# Patient Record
Sex: FEMALE | Race: WHITE | NOT HISPANIC OR LATINO | Employment: FULL TIME | URBAN - METROPOLITAN AREA
[De-identification: names, ages, dates, MRNs, and addresses within clinical notes are randomized per-mention and may not be internally consistent; named-entity substitution may affect disease eponyms.]

---

## 2018-03-07 ENCOUNTER — HOSPITAL ENCOUNTER (OUTPATIENT)
Dept: LAB | Facility: MEDICAL CENTER | Age: 38
End: 2018-03-07
Attending: NURSE PRACTITIONER
Payer: COMMERCIAL

## 2018-03-07 LAB
25(OH)D3 SERPL-MCNC: 4 NG/ML (ref 30–100)
ALBUMIN SERPL BCP-MCNC: 4.2 G/DL (ref 3.2–4.9)
ALBUMIN/GLOB SERPL: 1.7 G/DL
ALP SERPL-CCNC: 46 U/L (ref 30–99)
ALT SERPL-CCNC: 16 U/L (ref 2–50)
ANION GAP SERPL CALC-SCNC: 9 MMOL/L (ref 0–11.9)
AST SERPL-CCNC: 25 U/L (ref 12–45)
BASOPHILS # BLD AUTO: 1.4 % (ref 0–1.8)
BASOPHILS # BLD: 0.08 K/UL (ref 0–0.12)
BILIRUB SERPL-MCNC: 0.7 MG/DL (ref 0.1–1.5)
BUN SERPL-MCNC: 5 MG/DL (ref 8–22)
CALCIUM SERPL-MCNC: 8.9 MG/DL (ref 8.5–10.5)
CHLORIDE SERPL-SCNC: 102 MMOL/L (ref 96–112)
CHOLEST SERPL-MCNC: 143 MG/DL (ref 100–199)
CO2 SERPL-SCNC: 27 MMOL/L (ref 20–33)
CREAT SERPL-MCNC: 0.68 MG/DL (ref 0.5–1.4)
EOSINOPHIL # BLD AUTO: 0.35 K/UL (ref 0–0.51)
EOSINOPHIL NFR BLD: 6 % (ref 0–6.9)
ERYTHROCYTE [DISTWIDTH] IN BLOOD BY AUTOMATED COUNT: 46.5 FL (ref 35.9–50)
GLOBULIN SER CALC-MCNC: 2.5 G/DL (ref 1.9–3.5)
GLUCOSE SERPL-MCNC: 71 MG/DL (ref 65–99)
HCT VFR BLD AUTO: 42.3 % (ref 37–47)
HDLC SERPL-MCNC: 84 MG/DL
HGB BLD-MCNC: 15 G/DL (ref 12–16)
IMM GRANULOCYTES # BLD AUTO: 0.02 K/UL (ref 0–0.11)
IMM GRANULOCYTES NFR BLD AUTO: 0.3 % (ref 0–0.9)
LDLC SERPL CALC-MCNC: 19 MG/DL
LYMPHOCYTES # BLD AUTO: 1.67 K/UL (ref 1–4.8)
LYMPHOCYTES NFR BLD: 28.8 % (ref 22–41)
MCH RBC QN AUTO: 36.6 PG (ref 27–33)
MCHC RBC AUTO-ENTMCNC: 35.5 G/DL (ref 33.6–35)
MCV RBC AUTO: 103.2 FL (ref 81.4–97.8)
MONOCYTES # BLD AUTO: 0.57 K/UL (ref 0–0.85)
MONOCYTES NFR BLD AUTO: 9.8 % (ref 0–13.4)
NEUTROPHILS # BLD AUTO: 3.11 K/UL (ref 2–7.15)
NEUTROPHILS NFR BLD: 53.7 % (ref 44–72)
NRBC # BLD AUTO: 0 K/UL
NRBC BLD-RTO: 0 /100 WBC
PLATELET # BLD AUTO: 241 K/UL (ref 164–446)
PMV BLD AUTO: 9.4 FL (ref 9–12.9)
POTASSIUM SERPL-SCNC: 3.6 MMOL/L (ref 3.6–5.5)
PROT SERPL-MCNC: 6.7 G/DL (ref 6–8.2)
RBC # BLD AUTO: 4.1 M/UL (ref 4.2–5.4)
SODIUM SERPL-SCNC: 138 MMOL/L (ref 135–145)
T4 FREE SERPL-MCNC: 0.84 NG/DL (ref 0.53–1.43)
TRIGL SERPL-MCNC: 202 MG/DL (ref 0–149)
TSH SERPL DL<=0.005 MIU/L-ACNC: 1.27 UIU/ML (ref 0.38–5.33)
WBC # BLD AUTO: 5.8 K/UL (ref 4.8–10.8)

## 2018-03-07 PROCEDURE — 85025 COMPLETE CBC W/AUTO DIFF WBC: CPT

## 2018-03-07 PROCEDURE — 84443 ASSAY THYROID STIM HORMONE: CPT

## 2018-03-07 PROCEDURE — 80053 COMPREHEN METABOLIC PANEL: CPT

## 2018-03-07 PROCEDURE — 80061 LIPID PANEL: CPT

## 2018-03-07 PROCEDURE — 36415 COLL VENOUS BLD VENIPUNCTURE: CPT

## 2018-03-07 PROCEDURE — 82306 VITAMIN D 25 HYDROXY: CPT

## 2018-03-07 PROCEDURE — 84439 ASSAY OF FREE THYROXINE: CPT

## 2018-03-19 ENCOUNTER — HOSPITAL ENCOUNTER (OUTPATIENT)
Dept: LAB | Facility: MEDICAL CENTER | Age: 38
End: 2018-03-19
Attending: NURSE PRACTITIONER
Payer: COMMERCIAL

## 2018-03-19 LAB
B-HCG SERPL-ACNC: <0.6 MIU/ML (ref 0–5)
ESTRADIOL SERPL-MCNC: 171 PG/ML
FSH SERPL-ACNC: 3.5 MIU/ML
LH SERPL-ACNC: 11 IU/L
PROLACTIN SERPL-MCNC: 15.93 NG/ML (ref 2.8–26)

## 2018-03-19 PROCEDURE — 82670 ASSAY OF TOTAL ESTRADIOL: CPT

## 2018-03-19 PROCEDURE — 84702 CHORIONIC GONADOTROPIN TEST: CPT

## 2018-03-19 PROCEDURE — 84146 ASSAY OF PROLACTIN: CPT

## 2018-03-19 PROCEDURE — 36415 COLL VENOUS BLD VENIPUNCTURE: CPT

## 2018-03-19 PROCEDURE — 83001 ASSAY OF GONADOTROPIN (FSH): CPT

## 2018-03-19 PROCEDURE — 83002 ASSAY OF GONADOTROPIN (LH): CPT

## 2018-04-22 ENCOUNTER — OFFICE VISIT (OUTPATIENT)
Dept: URGENT CARE | Facility: CLINIC | Age: 38
End: 2018-04-22
Payer: COMMERCIAL

## 2018-04-22 VITALS
OXYGEN SATURATION: 97 % | BODY MASS INDEX: 23.93 KG/M2 | TEMPERATURE: 98.3 F | WEIGHT: 126.76 LBS | HEIGHT: 61 IN | SYSTOLIC BLOOD PRESSURE: 140 MMHG | DIASTOLIC BLOOD PRESSURE: 104 MMHG | HEART RATE: 97 BPM | RESPIRATION RATE: 20 BRPM

## 2018-04-22 DIAGNOSIS — K29.60 NSAID INDUCED GASTRITIS: ICD-10-CM

## 2018-04-22 DIAGNOSIS — T39.395A NSAID INDUCED GASTRITIS: ICD-10-CM

## 2018-04-22 DIAGNOSIS — R10.9 ABDOMINAL CRAMPS: ICD-10-CM

## 2018-04-22 DIAGNOSIS — K08.89 ODONTALGIA: ICD-10-CM

## 2018-04-22 PROCEDURE — 99202 OFFICE O/P NEW SF 15 MIN: CPT | Performed by: PHYSICIAN ASSISTANT

## 2018-04-22 RX ORDER — CLINDAMYCIN HYDROCHLORIDE 300 MG/1
300 CAPSULE ORAL 2 TIMES DAILY
Qty: 14 CAP | Refills: 0 | Status: SHIPPED | OUTPATIENT
Start: 2018-04-22 | End: 2018-04-29

## 2018-04-22 ASSESSMENT — ENCOUNTER SYMPTOMS
ROS GI COMMENTS: 1
DIARRHEA: 0
FEVER: 0
CHANGE IN BOWEL HABIT: 0
ABDOMINAL PAIN: 1
BLOOD IN STOOL: 0
SORE THROAT: 0
NAUSEA: 0
CONSTITUTIONAL NEGATIVE: 1
VOMITING: 0

## 2018-04-22 NOTE — PROGRESS NOTES
"Subjective:      Kenisha Domingo is a 37 y.o. female who presents with GI Problem (cramping,sweat,clami)            GI Problem   This is a new (cramps, sweats w/o fever; started after wisdom tooth extract.; has been taking motrin 5d on empty stomach) problem. The current episode started yesterday. The problem occurs constantly. The problem has been unchanged. Associated symptoms include abdominal pain. Pertinent negatives include no change in bowel habit, fever, nausea, sore throat or vomiting. Nothing aggravates the symptoms. She has tried nothing for the symptoms. The treatment provided no relief.       Review of Systems   Constitutional: Negative.  Negative for fever.   HENT: Negative.  Negative for sore throat.    Gastrointestinal: Positive for abdominal pain. Negative for blood in stool, change in bowel habit, diarrhea, nausea and vomiting.        1   Genitourinary: Negative.    Skin: Negative.           Objective:     /104   Pulse 97   Temp 36.8 °C (98.3 °F)   Resp 20   Ht 1.549 m (5' 1\")   Wt 57.5 kg (126 lb 12.2 oz)   SpO2 97%   BMI 23.95 kg/m²      Physical Exam   Constitutional: She is oriented to person, place, and time. She appears well-developed and well-nourished. No distress.   HENT:   Head: Normocephalic and atraumatic.   Abdominal: Soft. Bowel sounds are normal. She exhibits no distension. There is no tenderness.   Neurological: She is alert and oriented to person, place, and time.   Skin: Skin is warm and dry.   Psychiatric: She has a normal mood and affect. Her behavior is normal.   Nursing note and vitals reviewed.    Active Ambulatory Problems     Diagnosis Date Noted   • No Active Ambulatory Problems     Resolved Ambulatory Problems     Diagnosis Date Noted   • No Resolved Ambulatory Problems     No Additional Past Medical History     No current outpatient prescriptions on file prior to visit.     No current facility-administered medications on file prior to visit.  "     Social History     Social History   • Marital status:      Spouse name: N/A   • Number of children: N/A   • Years of education: N/A     Occupational History   • Not on file.     Social History Main Topics   • Smoking status: Current Every Day Smoker   • Smokeless tobacco: Never Used   • Alcohol use Not on file   • Drug use: Unknown   • Sexual activity: Not on file     Other Topics Concern   • Not on file     Social History Narrative   • No narrative on file     History reviewed. No pertinent family history.  Patient has no known allergies.              Assessment/Plan:     ·  GI cramps, sweats [s/p wisdom tooth surg.]      · ?infection; rx meds; trial lomotil prn

## 2018-04-25 ENCOUNTER — HOSPITAL ENCOUNTER (EMERGENCY)
Facility: MEDICAL CENTER | Age: 38
End: 2018-04-26
Attending: EMERGENCY MEDICINE
Payer: COMMERCIAL
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
## 2018-04-25 DIAGNOSIS — F10.929 ALCOHOLIC INTOXICATION WITH COMPLICATION (HCC): ICD-10-CM

## 2018-04-25 PROCEDURE — 99284 EMERGENCY DEPT VISIT MOD MDM: CPT

## 2018-04-25 ASSESSMENT — PAIN SCALES - GENERAL: PAINLEVEL_OUTOF10: 3

## 2018-04-26 VITALS
DIASTOLIC BLOOD PRESSURE: 67 MMHG | SYSTOLIC BLOOD PRESSURE: 119 MMHG | BODY MASS INDEX: 26.07 KG/M2 | RESPIRATION RATE: 18 BRPM | WEIGHT: 138 LBS | TEMPERATURE: 98.1 F | OXYGEN SATURATION: 99 % | HEART RATE: 86 BPM

## 2018-04-26 ASSESSMENT — ENCOUNTER SYMPTOMS
BACK PAIN: 0
DIZZINESS: 0
SHORTNESS OF BREATH: 0
FEVER: 0
ABDOMINAL PAIN: 0

## 2018-04-26 ASSESSMENT — PAIN SCALES - GENERAL: PAINLEVEL_OUTOF10: 0

## 2018-04-26 NOTE — ED PROVIDER NOTES
ED Provider Note    ED Provider Note          CHIEF COMPLAINT  Chief Complaint   Patient presents with   • Fall   • Alcohol Intoxication       HPI  Kenisha Domingo is a 37 y.o. female who presents to the Emergency Department for concern of ines [place] home. She had a couple [place] shots. She fell in the shower. There has been & were having trouble getting out of the shower because she is not intoxicated therefore she was brought in by EMS. She currently now is more sober. She is denying any pain to me. She is asking to go home     REVIEW OF SYSTEMS  Review of Systems   Constitutional: Negative for fever.   Respiratory: Negative for shortness of breath.    Gastrointestinal: Negative for abdominal pain.   Genitourinary: Negative for difficulty urinating.   Musculoskeletal: Negative for back pain.   Neurological: Negative for dizziness.       PAST MEDICAL HISTORY       SURGICAL HISTORY  patient denies any surgical history    SOCIAL HISTORY  Social History   Substance Use Topics   • Smoking status: Current Every Day Smoker   • Smokeless tobacco: Never Used   • Alcohol use Not on file      History   Drug use: Unknown       FAMILY HISTORY  No family history on file.    CURRENT MEDICATIONS  Reviewed.  See Encounter Summary.     ALLERGIES  No Known Allergies    PHYSICAL EXAM  VITAL SIGNS: /67   Pulse 86   Temp 36.7 °C (98.1 °F)   Resp 18   Wt 62.6 kg (138 lb)   SpO2 99%   BMI 26.07 kg/m²   Physical Exam   Constitutional: She is oriented to person, place, and time.   HENT:   Head: Normocephalic and atraumatic.   Eyes: Pupils are equal, round, and reactive to light.   Neck: Normal range of motion.   Cardiovascular: Normal rate.    Pulmonary/Chest: Effort normal.   Abdominal: Soft.   Musculoskeletal: She exhibits no deformity.   Neurological: She is alert and oriented to person, place, and time.   Skin: Skin is warm. She is not diaphoretic.   Psychiatric: She has a normal mood and affect.  "            COURSE & MEDICAL DECISION MAKING  Pertinent Labs & Imaging studies reviewed. (See chart for details)    1:15 AM - Patient seen and examined at bedside.         Decision Making:  This is a 37 y.o. year old female who presents with concern about intoxication. When I thought her she is alert and oriented ×4. She had no slurred speech. She has steady gait. She did admit to drinking earlier. She had no signs of any trauma injuries. The fall was very minor. She said that they just called the EMS because \"they are man\" and they did not know what to do with her.  She is observed here in the emergency department and continued to metabolize her alcohol. She had no further complaints. She is discharged home in stable condition.    DISPOSITION:  Patient will be discharged home in stable condition.    FOLLOW UP:  [facility], Emergency Dept  [address] [facility] [address]  [phone]    If symptoms worsen      OUTPATIENT MEDICATIONS:  Discharge Medication List as of 4/26/2018  1:20 AM            FINAL IMPRESSION  1. Alcoholic intoxication with complication (CMS-HCC)    2. Fall in home, initial encounter                   "

## 2018-04-26 NOTE — ED NOTES
Patient discharged home, ambulates independently, a&Ox4. Verbalizes understanding of discharge instructions with opportunity for questions.

## 2018-04-26 NOTE — ED NOTES
Patient and  arguing with each other. Patient removed c-collar, cardiac leads, BP, and pulse ox. Patient ambulating around the room.  asked to leave as his presence is only agitating her. Pt refusing to keep c-collar on, but agrees to get back in bed for MD tariq.

## 2018-04-26 NOTE — ED NOTES
Enters by EMS c/o unwitnessed GLF in shower PTA. Unknown LOC. Reports slipped and fell. EMS reports patient could not get up d/t ETOH so  called for assistance. Pt reports drinking 6 anam shots and 3 beers. No obvious head trauma. Patient reports 3/10 pain when palpating T5-T7. C-collar in place. Denies blood thinners. Patient anxious and tearful at triage, asking for her .  en route via taxi. Blood glucose en route 140.

## 2018-04-26 NOTE — DISCHARGE INSTRUCTIONS
Alcohol, Frequently Asked Questions  WHAT IS ALCOHOL?  Ethyl alcohol, or ethanol, affects mood or behavior (psychoactive). It is a drug found in beer, wine, and hard liquor. Hard liquor is whiskey, vodka, gin, etc. Alcohol is produced by the fermentation of yeast, sugars, and starches.   WHAT DOES ALCOHOL DO TO THE BODY?  · Alcohol is a central nervous system depressant.   · It is rapidly absorbed from the stomach and small intestine. It passes into the bloodstream. It is then widely distributed throughout the body.   · Harmful effects of alcohol, can include:   · Impaired judgment.   · Reduced reaction time.   · Slurred speech.   · Difficulty walking.   · The effects of alcohol on the body are directly related to the amount consumed.   · In small amounts, alcohol can have a relaxing effect.   · When consumed rapidly and in large amounts, alcohol can also result in coma and death.   · Alcohol can interact with a number of prescription and non-prescription medications in ways that can intensify the effect of alcohol, of the medications themselves, or both.   · Alcohol use by pregnant women can cause serious damage to the developing fetus.   WHAT IS A STANDARD DRINK?  A standard drink is one 12 ounce beer, one 5 ounce glass of wine, or one 1.5 ounce shot of distilled spirits. Each of these drinks contains about half an ounce of alcohol.   IS BEER OR WINE SAFER TO DRINK THAN HARD LIQUOR?   No. One 12 ounce beer has about the same amount of alcohol as one 5 ounce glass of wine, or one 1.5 ounce shot of liquor.   WHAT IS MODERATE DRINKING?  Based on current [place]. Government dietary guidelines, moderate drinking for women is defined as an average of 1 drink or less per day. Moderate drinking for men is defined as an average of 2 drinks or less per day.   WHAT IS HEAVY DRINKING?  Heavy drinking is consuming alcohol in excess of 1 drink per day on average for women and greater than 2 drinks per day on average for men.   WHAT  "IS BINGE DRINKING?  Binge drinking is generally defined as having 5 or more drinks on one occasion. One occasion means in a row or within a short period of time. However, among women, binge drinking is often defined as having 4 or more drinks on one occasion. This lower cut-point is used for women because women are generally of smaller stature than men.   WHAT IS ALCOHOLISM?  Alcoholism is a primary, long-lasting (chronic) disease with inherited (genetic) tendencies. Alcoholism has psychological and social (psychosocial), and environmental factors influencing its development and signs and symptoms. The disease often becomes more severe (progressive) and eventually fatal. It is characterized by continuous or periodic:  · Lack of control over drinking.   · Fixation with the drug alcohol.   · Use of alcohol despite harmful consequences.   · Distortions in thinking, including denial.   WHAT IS ALCOHOL ABUSE?  Alcohol abuse is characterized by recurrent alcohol-related problems, including problems with relationships, job performance, or both; the use of alcohol in hazardous situations (e.g., while driving a car); or some combination of these.  WHY ARE SOME PEOPLE MORE SENSITIVE TO ALCOHOL THAN OTHERS?  Individual reactions to alcohol can vary greatly, and may be influenced by many factors, including:  · Age.   · Gender.   · Race.   · A specific origin or culture (ethnicity).   · Physical condition.   · The amount of food eaten before drinking.   · The use of drugs or medicines.   · Family history of alcohol problems.   · Many other factors as well.   Therefore, while drinking guidelines and definitions of drinking patterns can be very helpful in identifying risky patterns of alcohol use, personal decisions about whether to drink, and if so, when and how much, should take into account these individual factors as well.   WHAT DOES IT MEAN TO DRINK TOO MUCH?  · A person may be said to be \"drinking too much\" or engaging in " "\"excessive drinking\" if they exceed the guidelines for average or daily alcohol consumption.   · Among men, excessive drinking may be defined as more than 4 drinks per day or an average of more than 2 drinks per day over a 7 or 30 day period.   · Among women, excessive drinking may be defined as more than 3 drinks per day or an average of more than 1 drink per day over a 7 or 30 day period.   · Current guidelines state that some individuals (youth under age 21 years, pregnant women, and persons recovering from alcoholism) should not drink at all. Among these people, any alcohol consumption may be too much.   · Anyone who chooses to drink should be aware that individual reactions to alcohol can vary greatly. When unsure about drinking, it is best to consult one's own personal caregiver.   WHAT DOES IT MEAN TO GET DRUNK?  Drunkenness is the state of being intoxicated by consumption of alcoholic beverages to a degree that mental and physical faculties are noticeably impaired. However, the number of drinks that an individual needs to consume to get drunk varies based on a number of factors. These include: age, gender, physical condition, the amount of food eaten before drinking, and the use of drugs or medicines. Binge drinking typically results in intoxication.   WHAT DOES IT MEAN TO BE ABOVE THE LEGAL LIMIT FOR DRINKING?  Legal limits for operating a vehicle are defined by a government agency. Blood or breath tests are used to find out how much alcohol is in your system. If you are found to have more alcohol in your system than is allowed in your region, you will be punished by law. This does not mean it is safe to drive under the legal limit.  WHAT ARE COMMON HEALTH EFFECTS OF DRINKING TOO MUCH?  Excessive drinking, including binge and heavy drinking, has numerous chronic (long-term) and acute (short-term) health effects.  Chronic health consequences of excessive drinking can include:  · Damage to liver cells (liver " cirrhosis).   · Inflammation of the pancreas (pancreatitis).   · Various cancers, including:   · Liver cancer.   · Mouth cancer.   · Throat cancer.   · Voice box (larynx) cancer.   · Esophageal cancer.   · High blood pressure.   · Psychological disorders.   · Sudden (acute) health consequences of excessive drinking can include:   · Alcohol poisoning and death.   · Motor vehicle injuries.   · Falls.   · Domestic violence.   · Rape.   · Child abuse.   HOW DO I KNOW IF IT IS OKAY TO DRINK?  If you choose to drink alcohol, do so in moderation. Remember that there are some people who should not drink alcohol at all. These people include:  · Children and adolescents.   · People who cannot restrict their drinking to moderate levels.   · Women who may become pregnant or who are pregnant.   · People who plan to drive or operate machinery.   · People taking prescription or over-the-counter medications that can interact with alcohol.   When in doubt, it is always best to consult one's own caregiver.  HOW DO I KNOW IF I HAVE A DRINKING PROBLEM?  Drinking is a problem if it causes trouble in:  · Your relationships.   · School.   · Social activities.   · How you think and feel.   If you are concerned that either you or someone in your family might have a drinking problem, consult your caregivers. There are many resources available to assist people with drinking problems.  Document Released: 12/20/2004 Document Revised: 06/18/2013 Document Reviewed: 12/11/2009  ExitCare® Patient Information ©2013 [employer].    How Much is Too Much Alcohol?  Drinking too much alcohol can cause injury, accidents, and health problems. These types of problems can include:   · Car crashes.  · Falls.  · Family fighting (domestic violence).  · Drowning.  · Fights.  · Injuries.  · Burns.  · Damage to certain organs.  · Having a baby with birth defects.  ONE DRINK CAN BE TOO MUCH WHEN YOU ARE:  · Working.  · Pregnant or breastfeeding.  · Taking  "medicines. Ask your doctor.  · Driving or planning to drive.  WHAT IS A STANDARD DRINK?   · 1 regular beer (12 ounces or 360 milliliters).  · 1 glass of wine (5 ounces or 150 milliliters).  · 1 shot of liquor (1.5 ounces or 45 milliliters).  BLOOD ALCOHOL LEVELS   · .00 A person is sober.  · .03 A person has no trouble keeping balance, talking, or seeing right, but a \"buzz\" may be felt.  · .05 A person feels \"buzzed\" and relaxed.  · .08 or .10  A person is drunk. He or she has trouble talking, seeing right, and keeping his or her balance.  · .15 A person loses body control and may pass out (blackout).  · .20 A person has trouble walking (staggering) and throws up (vomits).  · .30 A person will pass out (unconscious).  · .40+ A person will be in a coma. Death is possible.  If you or someone you know has a drinking problem, get help from a doctor.   Document Released: 10/14/2010 Document Revised: 03/11/2013 Document Reviewed: 10/14/2010  [employer]® Patient Information ©2014 [employer].    "

## 2018-09-12 ENCOUNTER — HOSPITAL ENCOUNTER (OUTPATIENT)
Dept: LAB | Facility: MEDICAL CENTER | Age: 38
End: 2018-09-12
Attending: NURSE PRACTITIONER
Payer: COMMERCIAL

## 2018-09-12 LAB
25(OH)D3 SERPL-MCNC: 34 NG/ML (ref 30–100)
ALBUMIN SERPL BCP-MCNC: 4.1 G/DL (ref 3.2–4.9)
ALBUMIN/GLOB SERPL: 1.5 G/DL
ALP SERPL-CCNC: 47 U/L (ref 30–99)
ALT SERPL-CCNC: 14 U/L (ref 2–50)
ANION GAP SERPL CALC-SCNC: 7 MMOL/L (ref 0–11.9)
AST SERPL-CCNC: 23 U/L (ref 12–45)
BASOPHILS # BLD AUTO: 0.9 % (ref 0–1.8)
BASOPHILS # BLD: 0.06 K/UL (ref 0–0.12)
BILIRUB SERPL-MCNC: 0.6 MG/DL (ref 0.1–1.5)
BUN SERPL-MCNC: 7 MG/DL (ref 8–22)
CALCIUM SERPL-MCNC: 9 MG/DL (ref 8.5–10.5)
CHLORIDE SERPL-SCNC: 102 MMOL/L (ref 96–112)
CHOLEST SERPL-MCNC: 177 MG/DL (ref 100–199)
CO2 SERPL-SCNC: 26 MMOL/L (ref 20–33)
CREAT SERPL-MCNC: 0.58 MG/DL (ref 0.5–1.4)
EOSINOPHIL # BLD AUTO: 0.31 K/UL (ref 0–0.51)
EOSINOPHIL NFR BLD: 4.8 % (ref 0–6.9)
ERYTHROCYTE [DISTWIDTH] IN BLOOD BY AUTOMATED COUNT: 47 FL (ref 35.9–50)
GLOBULIN SER CALC-MCNC: 2.7 G/DL (ref 1.9–3.5)
GLUCOSE SERPL-MCNC: 94 MG/DL (ref 65–99)
HCT VFR BLD AUTO: 43.3 % (ref 37–47)
HDLC SERPL-MCNC: 77 MG/DL
HGB BLD-MCNC: 14.9 G/DL (ref 12–16)
IMM GRANULOCYTES # BLD AUTO: 0.02 K/UL (ref 0–0.11)
IMM GRANULOCYTES NFR BLD AUTO: 0.3 % (ref 0–0.9)
LDLC SERPL CALC-MCNC: 83 MG/DL
LYMPHOCYTES # BLD AUTO: 1.59 K/UL (ref 1–4.8)
LYMPHOCYTES NFR BLD: 24.7 % (ref 22–41)
MCH RBC QN AUTO: 35 PG (ref 27–33)
MCHC RBC AUTO-ENTMCNC: 34.4 G/DL (ref 33.6–35)
MCV RBC AUTO: 101.6 FL (ref 81.4–97.8)
MONOCYTES # BLD AUTO: 0.64 K/UL (ref 0–0.85)
MONOCYTES NFR BLD AUTO: 9.9 % (ref 0–13.4)
NEUTROPHILS # BLD AUTO: 3.83 K/UL (ref 2–7.15)
NEUTROPHILS NFR BLD: 59.4 % (ref 44–72)
NRBC # BLD AUTO: 0 K/UL
NRBC BLD-RTO: 0 /100 WBC
PLATELET # BLD AUTO: 262 K/UL (ref 164–446)
PMV BLD AUTO: 9.9 FL (ref 9–12.9)
POTASSIUM SERPL-SCNC: 3.6 MMOL/L (ref 3.6–5.5)
PROT SERPL-MCNC: 6.8 G/DL (ref 6–8.2)
RBC # BLD AUTO: 4.26 M/UL (ref 4.2–5.4)
SODIUM SERPL-SCNC: 135 MMOL/L (ref 135–145)
T4 FREE SERPL-MCNC: 0.94 NG/DL (ref 0.53–1.43)
TRIGL SERPL-MCNC: 87 MG/DL (ref 0–149)
TSH SERPL DL<=0.005 MIU/L-ACNC: 1.15 UIU/ML (ref 0.38–5.33)
WBC # BLD AUTO: 6.5 K/UL (ref 4.8–10.8)

## 2018-09-12 PROCEDURE — 84443 ASSAY THYROID STIM HORMONE: CPT

## 2018-09-12 PROCEDURE — 80061 LIPID PANEL: CPT

## 2018-09-12 PROCEDURE — 36415 COLL VENOUS BLD VENIPUNCTURE: CPT

## 2018-09-12 PROCEDURE — 85025 COMPLETE CBC W/AUTO DIFF WBC: CPT

## 2018-09-12 PROCEDURE — 84439 ASSAY OF FREE THYROXINE: CPT

## 2018-09-12 PROCEDURE — 80053 COMPREHEN METABOLIC PANEL: CPT

## 2018-09-12 PROCEDURE — 82306 VITAMIN D 25 HYDROXY: CPT

## 2018-09-19 ENCOUNTER — HOSPITAL ENCOUNTER (OUTPATIENT)
Dept: BEHAVIORAL HEALTH | Facility: MEDICAL CENTER | Age: 38
End: 2018-09-19
Attending: PSYCHIATRY & NEUROLOGY
Payer: COMMERCIAL

## 2018-09-19 ENCOUNTER — OFFICE VISIT (OUTPATIENT)
Dept: BEHAVIORAL HEALTH | Facility: PHYSICIAN GROUP | Age: 38
End: 2018-09-19
Payer: COMMERCIAL

## 2018-09-19 VITALS
DIASTOLIC BLOOD PRESSURE: 91 MMHG | HEART RATE: 76 BPM | WEIGHT: 130 LBS | HEIGHT: 61 IN | SYSTOLIC BLOOD PRESSURE: 130 MMHG | BODY MASS INDEX: 24.55 KG/M2

## 2018-09-19 DIAGNOSIS — F32.A DEPRESSIVE DISORDER: ICD-10-CM

## 2018-09-19 DIAGNOSIS — F10.90 ALCOHOL USE DISORDER: ICD-10-CM

## 2018-09-19 DIAGNOSIS — F41.1 GENERALIZED ANXIETY DISORDER: ICD-10-CM

## 2018-09-19 DIAGNOSIS — F33.1 MAJOR DEPRESSIVE DISORDER, RECURRENT EPISODE, MODERATE (HCC): ICD-10-CM

## 2018-09-19 DIAGNOSIS — F10.20 ALCOHOL USE DISORDER, SEVERE, DEPENDENCE (HCC): ICD-10-CM

## 2018-09-19 DIAGNOSIS — F41.9 ANXIETY DISORDER, UNSPECIFIED TYPE: ICD-10-CM

## 2018-09-19 PROCEDURE — 99204 OFFICE O/P NEW MOD 45 MIN: CPT | Performed by: PSYCHIATRY & NEUROLOGY

## 2018-09-19 PROCEDURE — 90791 PSYCH DIAGNOSTIC EVALUATION: CPT | Performed by: MARRIAGE & FAMILY THERAPIST

## 2018-09-19 RX ORDER — FLUOXETINE HYDROCHLORIDE 20 MG/1
20 CAPSULE ORAL DAILY
COMMUNITY
End: 2018-09-26
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
## 2018-09-20 ENCOUNTER — HOSPITAL ENCOUNTER (OUTPATIENT)
Dept: BEHAVIORAL HEALTH | Facility: MEDICAL CENTER | Age: 38
End: 2018-09-20
Attending: PSYCHIATRY & NEUROLOGY
Payer: COMMERCIAL

## 2018-09-20 DIAGNOSIS — F32.A DEPRESSIVE DISORDER: ICD-10-CM

## 2018-09-20 DIAGNOSIS — F10.20 ALCOHOL USE DISORDER, SEVERE, DEPENDENCE (HCC): ICD-10-CM

## 2018-09-20 PROCEDURE — G0177 OPPS/PHP; TRAIN & EDUC SERV: HCPCS

## 2018-09-20 PROCEDURE — G0410 GRP PSYCH PARTIAL HOSP 45-50: HCPCS

## 2018-09-20 PROCEDURE — G0176 OPPS/PHP;ACTIVITY THERAPY: HCPCS

## 2018-09-20 NOTE — ADDENDUM NOTE
Encounter addended by: Carmen Kincaid R.N. on: 9/20/2018  1:34 PM<BR>    Actions taken: Sign clinical note

## 2018-09-20 NOTE — H&P
Group Therapy Checklist  Attendance: Attended  Attendance Duration (min):  (90 min.)  Number of Participants: 8  Program/Group: Partial Hospital Program  Topics Covered:  (Group Therapy)  Participation: Active verbal participation, Attentive (Pt shared on her first day in treatment that she was here for alcoholism.)  Affect/Mood Range: Constricted  Affect/Mood Display: Congruent w/content  Cognition: Oriented, Alert  Evidence of Imminent Suicide Risk: No  Evidence of imminent homicide risk: No  Therapeutic Interventions: Emotion clarification, Cognitive clarification  Progress Toward Treatment Goal: Moderate improvement

## 2018-09-20 NOTE — BH THERAPY
Attended 120 minutes  Topic: The Shift  Active verbal participation, attentive  Affect/mood: normal range, flexible  Affect/mood display: congruent  Cognition: alert, oriented  Evidence of suicide risk: No  Therapeutic intervention: cognitive clarification  Moderate improvement

## 2018-09-20 NOTE — BH THERAPY
Attended 60 minutes  Topic:  Mindfulness  Active verbal participation, attentive  Affect/mood: normal range, flexible  Affect/mood display: congruent  Cognition: alert, oriented  Evidence of suicide risk: No  Therapeutic intervention: cognitive and emotional clarification  Moderate improvement

## 2018-09-20 NOTE — ADDENDUM NOTE
Encounter addended by: Carmen Kincaid R.N. on: 9/20/2018  1:49 PM<BR>    Actions taken: Charge Capture section accepted, Visit diagnoses modified

## 2018-09-21 ENCOUNTER — HOSPITAL ENCOUNTER (OUTPATIENT)
Dept: BEHAVIORAL HEALTH | Facility: MEDICAL CENTER | Age: 38
End: 2018-09-21
Attending: PSYCHIATRY & NEUROLOGY
Payer: COMMERCIAL

## 2018-09-21 DIAGNOSIS — F32.A DEPRESSIVE DISORDER: ICD-10-CM

## 2018-09-21 DIAGNOSIS — F10.20 ALCOHOL USE DISORDER, SEVERE, DEPENDENCE (HCC): ICD-10-CM

## 2018-09-21 PROCEDURE — G0176 OPPS/PHP;ACTIVITY THERAPY: HCPCS

## 2018-09-21 PROCEDURE — G0177 OPPS/PHP; TRAIN & EDUC SERV: HCPCS

## 2018-09-21 PROCEDURE — G0410 GRP PSYCH PARTIAL HOSP 45-50: HCPCS

## 2018-09-21 NOTE — H&P
Group Therapy Checklist  Attendance: Attended  Attendance Duration (min):  (90 min.)  Number of Participants: 11  Program/Group: Partial Hospital Program  Topics Covered: Weekend planning  Participation: Active verbal participation (Pt stated she will work on projects at home and try to go to a meeting.  Family only has one car so transportation can get limited. )  Affect/Mood Range: Normal range  Affect/Mood Display: Congruent w/content  Cognition: Oriented, Alert  Evidence of Imminent Suicide Risk: No  Evidence of imminent homicide risk: No  Therapeutic Interventions: Emotion clarification, Cognitive clarification  Progress Toward Treatment Goal: Moderate improvement

## 2018-09-21 NOTE — BH THERAPY
Attended 60 minutes  Topic: Core beliefs, belief systems  Active verbal participation, attentive  Affect/mood: normal range, flexible  Affect/mood display: congruent  Cognition: alert, oriented  Evidence of suicide risk: No  Therapeutic intervention: cognitive clarification  Moderate improvement

## 2018-09-21 NOTE — CARE PLAN
"Problem: Substance Induced Symptoms  Goal: Abstinence  PHP: 5 days weekly M-F 9a-3p pt will abstain from all drugs of abuse while in program and by applying recovery skills with active 12 step involvement aeb verbalizing abstinence and sobriety progress at individual sessions.     Intervention: Individual Counseling Sessions   [facility]  Therapy Progress Note    Patient Name: Kenisha Domingo  Patient MRN: [id number]  Today's Date: 9/21/2018     Type of session:Individual psychotherapy  Length of session: 30 minutes  Persons in attendance:Patient    Subjective/New Info: initial. Assessments to be completed at next session. Meet with spouse on Tuesday. Reports abstinence; meeting schedule discussed for AA.     Objective/Observations:   Participation: Active verbal participation, Attentive, Engaged and Open to feedback   Grooming: Casual and Neat   Cognition: Alert and Fully Oriented   Eye contact: Good   Mood: Anxious   Affect: Flexible, Full range and Congruent with content   Thought process: Logical and Goal-directed   Speech: Rate within normal limits and Volume within normal limits   Other:     Diagnoses: F10.20    Current risk:   SUICIDE: Not applicable   Homicide: Not applicable   Self-harm: Not applicable   Relapse: Low   Other:    Safety Plan reviewed? Not Indicated   If evidence of imminent risk is present, intervention/plan:     Therapeutic Intervention(s): Develop/modify treatment plan, Establish rapport, Goal-setting, Self-care skills, Stressors assessed and Supportive psychotherapy    Treatment Goal(s)/Objective(s) addressed: develop treatment plan and establish rapport     Progress toward Treatment Goals: Return to baseline    Plan:  - Continue Partial Hospital Program and 12-Step participation  - \"Homework\" recommendation: intake assessments.   - Next appointment scheduled:  9/24/2018  - Patient is in agreement with the above plan:  YES    Carmen Kincaid, " ELHAM  9/21/2018

## 2018-09-21 NOTE — BH THERAPY
1300: Attended 60 minutes  Topic: Habit  Active verbal participation, attentive  Affect/mood: normal range, flexible  Affect/mood display: congruent  Cognition: alert, oriented  Evidence of suicide risk: No  Therapeutic intervention: cognitive clarification  Moderate improvement

## 2018-09-21 NOTE — ADDENDUM NOTE
Encounter addended by: Carmen Kincaid R.N. on: 9/21/2018  3:52 PM<BR>    Actions taken: Care Plan modified, Sign clinical note, Care Plan progress modified, Charge Capture section accepted, Visit diagnoses modified

## 2018-09-24 ENCOUNTER — HOSPITAL ENCOUNTER (OUTPATIENT)
Dept: LAB | Facility: MEDICAL CENTER | Age: 38
End: 2018-09-24
Attending: PSYCHIATRY & NEUROLOGY
Payer: COMMERCIAL

## 2018-09-24 ENCOUNTER — HOSPITAL ENCOUNTER (OUTPATIENT)
Dept: BEHAVIORAL HEALTH | Facility: MEDICAL CENTER | Age: 38
End: 2018-09-24
Attending: PSYCHIATRY & NEUROLOGY
Payer: COMMERCIAL

## 2018-09-24 DIAGNOSIS — F10.20 ALCOHOL USE DISORDER, SEVERE, DEPENDENCE (HCC): ICD-10-CM

## 2018-09-24 DIAGNOSIS — F32.A DEPRESSIVE DISORDER: ICD-10-CM

## 2018-09-24 LAB
FOLATE SERPL-MCNC: 9 NG/ML
VIT B12 SERPL-MCNC: 419 PG/ML (ref 211–911)

## 2018-09-24 PROCEDURE — 36415 COLL VENOUS BLD VENIPUNCTURE: CPT

## 2018-09-24 PROCEDURE — G0177 OPPS/PHP; TRAIN & EDUC SERV: HCPCS

## 2018-09-24 PROCEDURE — G0176 OPPS/PHP;ACTIVITY THERAPY: HCPCS

## 2018-09-24 PROCEDURE — 84425 ASSAY OF VITAMIN B-1: CPT

## 2018-09-24 PROCEDURE — 82607 VITAMIN B-12: CPT

## 2018-09-24 PROCEDURE — G0411 INTER ACTIVE GRP PSYCH PARTI: HCPCS

## 2018-09-24 PROCEDURE — 82746 ASSAY OF FOLIC ACID SERUM: CPT

## 2018-09-24 NOTE — BH THERAPY
0900 Attended 60 minutes  Topic: Addiction Behaviors  Active verbal participation, attentive  Affect/mood: normal range, flexible  Affect/mood display: congruent  Cognition: alert, oriented  Evidence of suicide risk: No  Therapeutic intervention: cognitive clarification  Moderate improvement

## 2018-09-24 NOTE — BH THERAPY
1400  Attended 60 minutes  Topic: Creative art  Active verbal participation, attentive  Affect/mood: normal range, flexible  Affect/mood display: congruent  Cognition: alert, oriented  Evidence of suicide risk: No  Therapeutic intervention: socialization  Moderate improvement  Active participation in art therapy.

## 2018-09-24 NOTE — BH THERAPY
1300  Attended 60 minutes  Topic: Relaxation techniques  Active verbal participation, attentive  Affect/mood: normal range, flexible  Affect/mood display: congruent  Cognition: alert, oriented  Evidence of suicide risk: No  Therapeutic intervention: cognitive clarification, mindfulness practice.  Moderate improvement

## 2018-09-25 ENCOUNTER — HOSPITAL ENCOUNTER (OUTPATIENT)
Dept: BEHAVIORAL HEALTH | Facility: MEDICAL CENTER | Age: 38
End: 2018-09-25
Attending: PSYCHIATRY & NEUROLOGY
Payer: COMMERCIAL

## 2018-09-25 DIAGNOSIS — F41.9 ANXIETY DISORDER, UNSPECIFIED TYPE: ICD-10-CM

## 2018-09-25 PROCEDURE — G0410 GRP PSYCH PARTIAL HOSP 45-50: HCPCS

## 2018-09-25 PROCEDURE — G0177 OPPS/PHP; TRAIN & EDUC SERV: HCPCS

## 2018-09-25 PROCEDURE — G0176 OPPS/PHP;ACTIVITY THERAPY: HCPCS

## 2018-09-25 NOTE — CARE PLAN
Problem: Substance Induced Symptoms  Goal: Abstinence  PHP: 5 days weekly M-F 9a-3p pt will abstain from all drugs of abuse while in program and by applying recovery skills with active 12 step involvement aeb verbalizing abstinence and sobriety progress at individual sessions.      Intervention: Family Counseling Sessions   [facility]  Therapy Progress Note    Patient Name: Kenisha Domingo  Patient MRN: [id number]  Today's Date: 9/25/2018     Type of session:Couples therapy  Length of session: 45 minutes  Persons in attendance:Patient and Spouse/Partner    Subjective/New Info: couples session with Jean-Pierre and Gloria. Both expressed anger, fears and frustration. Past wounds and hurts surfaced and both admit to bringing up the past, especially Jean-Pierre. Both agreed that the practice is hurtful and keeps them in anger. Agreed to a truce, explored the four horsemen with Von [place]ana therapy model.     Objective/Observations:   Participation: Active verbal participation, Attentive, Engaged and Open to feedback   Grooming: Casual and Neat   Cognition: Alert and Fully Oriented   Eye contact: Limited   Mood: Anxious, Angry and Irritable   Affect: Flexible, Full range, Expansive, Congruent with content and Angry   Thought process: Logical and Goal-directed   Speech: Rate within normal limits and Volume within normal limits   Other:     Diagnoses:   1. Anxiety disorder, unspecified type         Current risk:   SUICIDE: Not applicable   Homicide: Not applicable   Self-harm: Not applicable   Relapse: Not applicable   Other:    Safety Plan reviewed? Not Indicated   If evidence of imminent risk is present, intervention/plan:     Therapeutic Intervention(s): Communication skills, Conflict clarification, Conflict resolution skills, Distress tolerance skills, Limit-setting, Parenting/familial roles addressed, Stressors assessed and Supportive psychotherapy    Treatment Goal(s)/Objective(s) addressed: couples  session, conflict resolution, boundaries, communication skills     Progress toward Treatment Goals: Mild improvement    Plan:  - Continue Partial Hospital Program  - Patient is in agreement with the above plan:  YES    Carmen Kincaid R.N.  9/25/2018

## 2018-09-25 NOTE — BH THERAPY
Attendance: Attended   Attendance Duration (min): 46-60  Number of Participants: 11     Program/Group: Outpatient Therapy  Topics Covered: Values based action  Participation: Active verbal participation  Affect/Mood Range: Normal range  Affect/Mood Display: Congruent w/content  Cognition: Alert  Evidence of Imminent Suicide Risk: No  Evidence of imminent homicide risk: No  Therapeutic Interventions: Self-care skills, Goal-setting  Progress Toward Treatment Goal: No change

## 2018-09-25 NOTE — BH THERAPY
Attendance: Attended   Attendance Duration (min): Greater than 60  Number of Participants: 11     Program/Group: Intensive Outpatient Program  Topics Covered: Values based action  Participation: Active verbal participation  Affect/Mood Range: Normal range  Affect/Mood Display: Labile  Cognition: Alert  Evidence of Imminent Suicide Risk: No  Evidence of imminent homicide risk: No  Therapeutic Interventions: Cognitive modification  Progress Toward Treatment Goal: No change

## 2018-09-25 NOTE — BH THERAPY
Attendance: Attended   Attendance Duration (min): 60  Number of Participants: 3 (3)     Program/Group: Partial Hospital Program  Topics Covered: Active listening  Participation: Active verbal participation, Attentive  Affect/Mood Range: Normal range  Affect/Mood Display: Congruent w/content  Cognition: Alert, Oriented  Evidence of Imminent Suicide Risk: No  Evidence of imminent homicide risk: No  Therapeutic Interventions: Communication skills  Progress Toward Treatment Goal: Moderate improvement

## 2018-09-25 NOTE — H&P
Group Therapy Checklist  Attendance Duration (min): 46-60 (60 min.)  Number of Participants: 3 (3)  Program/Group: Partial Hospital Program  Topics Covered: Relaxation tech's  Participation: Active verbal participation, Attentive  Affect/Mood Range: Normal range  Affect/Mood Display: Congruent w/content  Cognition: Alert, Oriented  Evidence of Imminent Suicide Risk: No  Evidence of imminent homicide risk: No  Therapeutic Interventions: Communication skills, Leisure and Recreation skills, Socialization, Emotion clarification  Progress Toward Treatment Goal: Moderate improvement

## 2018-09-26 ENCOUNTER — HOSPITAL ENCOUNTER (OUTPATIENT)
Dept: BEHAVIORAL HEALTH | Facility: MEDICAL CENTER | Age: 38
End: 2018-09-26
Attending: PSYCHIATRY & NEUROLOGY
Payer: COMMERCIAL

## 2018-09-26 VITALS — HEIGHT: 61 IN | WEIGHT: 130 LBS | BODY MASS INDEX: 24.55 KG/M2

## 2018-09-26 DIAGNOSIS — F32.A DEPRESSIVE DISORDER: ICD-10-CM

## 2018-09-26 DIAGNOSIS — F41.9 ANXIETY DISORDER, UNSPECIFIED TYPE: ICD-10-CM

## 2018-09-26 DIAGNOSIS — F10.20 ALCOHOL USE DISORDER, SEVERE, DEPENDENCE (HCC): ICD-10-CM

## 2018-09-26 PROCEDURE — G0177 OPPS/PHP; TRAIN & EDUC SERV: HCPCS

## 2018-09-26 PROCEDURE — 99214 OFFICE O/P EST MOD 30 MIN: CPT | Performed by: PSYCHIATRY & NEUROLOGY

## 2018-09-26 PROCEDURE — G0411 INTER ACTIVE GRP PSYCH PARTI: HCPCS

## 2018-09-26 PROCEDURE — G0176 OPPS/PHP;ACTIVITY THERAPY: HCPCS

## 2018-09-26 PROCEDURE — 90853 GROUP PSYCHOTHERAPY: CPT

## 2018-09-26 RX ORDER — FLUOXETINE HYDROCHLORIDE 40 MG/1
40 CAPSULE ORAL DAILY
COMMUNITY
End: 2018-12-13 | Stop reason: SDUPTHER

## 2018-09-26 NOTE — BH THERAPY
Attendance: Attended   Attendance Duration (min): 60  Number of Participants: 2     Program/Group: Partial Hospital Program  Topics Covered: Other (Comment): Creative Art  Participation: Active verbal participation, Attentive  Affect/Mood Range: Normal range, Flexible  Affect/Mood Display: Congruent w/content  Cognition: Alert, Oriented  Evidence of Imminent Suicide Risk: No  Evidence of imminent homicide risk: No  Therapeutic Interventions: Leisure and Recreation skills, Socialization  Progress Toward Treatment Goal: Moderate improvement

## 2018-09-26 NOTE — PROGRESS NOTES
PARTIAL HOSPITALIZATION PROGRAM FOLLOW-UP NOTE      Chief Complaint   Patient presents with   • Follow-Up     alcohol addiction, anxiety, depression         History Of Present Illness:  Kenisha Domingo is a 38 y.o. old female with alcohol use disorder, depressive disorder, anxiety disorder seen today as PHP follow up, was last seen 1 week ago. She reports doing good since she started the program. She has been sober from alcohol and is feeling good about it. She had a hard weekend as she is having troubles in her marriage but she still did not relapse. She has reached out to her family and friends and is happy that she is getting positive responses. She has been compliant with Prozac and is feeling better in regards to her mood and anxiety. She is having some anxiety around her  who appears to be controlling. She is more hopeful and is getting to work on gaining her confidence back. She does have good support from her kids and is taking time to connect with them on a daily basis. Sleep has improved and is sleeping longer and better. Appetite is good. Denies any recent reckless or impulsive behaviors. She denies having thoughts of wanting to hurt self or others.    Social History:   She has been  ×2 and  ×1.  She has been  to her second  for 6+ years and lives with him and her 21-year-old-year-old daughter in [place].  She has another 18-year-old son who lives down the street from her.  She has been working at [employer] for 17+ years and works as a .     Substance Use:  Alcohol - Denies alcohol use since 9/17/18  Nicotine - Smokes 1 PPD  Illicit drugs - Denies     Medications:  Current Outpatient Prescriptions   Medication Sig Dispense Refill   • FLUoxetine (PROZAC) 20 MG Cap Take 20 mg by mouth every day.     • ASPIRIN 81 PO Take  by mouth.       No current facility-administered medications for this encounter.        Review Of Systems:   "  Constitutional - Negative for fatigue  Respiratory - Negative for shortness of breath, cough  CVS - Negative for chest pain, palpitations  GI - Negative for nausea, vomiting, abdominal pain, diarrhea, constipation  Musculoskeletal - Negative for back pain  Neurological - Negative for headaches  Psychiatric - Positive for anxiety, depression    Physical Examination:  Vital signs: Ht 1.549 m (5' 1\")   Wt 59 kg (130 lb) Comment: patient reported weight  LMP 09/06/2018 (Exact Date)   BMI 24.56 kg/m²     Musculoskeletal: Normal gait. No abnormal movements.     Mental Status Evaluation:   General: Young female, dressed in casual attire, good grooming and hygiene, in no apparent distress, calm and cooperative, good eye contact, no psychomotor agitation or retardation  Orientation: Alert and oriented to person, place and time  Recent and remote memory: Grossly intact  Attention span and concentration: Grossly intact  Speech: Spontaneous, normal rate, rhythm and tone  Thought Process: Linear, logical and goal directed  Thought Content: Denies suicidal or homicidal ideations, intent or plan  Perception: Denies auditory or visual hallucinations. No delusions noted  Associations: Intact  Language: Appropriate  Fund of knowledge and vocabulary: Grossly adequate  Mood: \"doing good\"  Affect: Anxious at times, mood congruent  Insight: Good  Judgment: Good      Impression:  1. Alcohol use disorder, severe (last alcohol use 9/17/18)  2. Depressive disorder, unspecified type  3. Anxiety disorder, unspecified type  4. Rule out posttraumatic stress disorder    Plan:  1. Continue Prozac 40 mg daily for depression and anxiety  2. Encouraged to maintain sobriety from alcohol  3. Discussed normal B12 and folic acid levels but still encouraged her to take B complex vitamin.     I certify that continued Partial Hospitalization services are medically necessary to improve and/or maintain the patient's condition, functional level, prevent " relapse or hospitalization and that this could not be done at a less intensive level of care due to:     [x] Persistence of significant psychiatric symptomology.  [x] Severity of patient's condition necessitates a continued intense level of service responsive to the patient's problems and accommodated by the function of the treatment program.    [x] Individualized, active treatment plan goals which are directed toward alleviation of the symptomology that precipitated the admission have not yet been attained.     Follow up in 1 week or sooner if symptoms worsen    The proposed treatment plan was discussed with the patient who was provided the opportunity to ask questions and make suggestions regarding alternative treatment. Patient verbalized understanding and expressed agreement with the plan.     Colleen Delgadillo M.D.  09/26/18    This note was created using voice recognition software (Dragon). The accuracy of the dictation is limited by the abilities of the software. I have reviewed the note prior to signing, however some errors in grammar and context are still possible. If you have any questions related to this note please do not hesitate to contact our office.

## 2018-09-26 NOTE — BH THERAPY
Attendance: Attended   Attendance Duration (min): 60  Number of Participants: 2     Program/Group: Partial Hospital Program  Topics Covered: Letting Go  Participation: Active verbal participation, Attentive  Affect/Mood Range: Normal range, Flexible  Affect/Mood Display: Congruent w/content  Cognition: Alert, Oriented  Evidence of Imminent Suicide Risk: No  Evidence of imminent homicide risk: No  Therapeutic Interventions: Cognitive clarification, Emotion clarification  Progress Toward Treatment Goal: Moderate improvement

## 2018-09-26 NOTE — BH THERAPY
Attendance: (P) Attended   Attendance Duration (min): (P) 60  Number of Participants: (P) 9     Program/Group: (P) Intensive Outpatient Program  Topics Covered: (P) Codependency  Participation: (P) Active verbal participation, Attentive  Affect/Mood Range: (P) Normal range, Flexible  Affect/Mood Display: (P) Congruent w/content  Cognition: (P) Alert, Oriented  Evidence of Imminent Suicide Risk: (P) No  Evidence of imminent homicide risk: (P) No  Therapeutic Interventions: (P) Cognitive clarification, Self-care skills  Progress Toward Treatment Goal: (P) Moderate improvement

## 2018-09-27 ENCOUNTER — HOSPITAL ENCOUNTER (OUTPATIENT)
Dept: BEHAVIORAL HEALTH | Facility: MEDICAL CENTER | Age: 38
End: 2018-09-27
Attending: PSYCHIATRY & NEUROLOGY
Payer: COMMERCIAL

## 2018-09-27 ENCOUNTER — TELEPHONE (OUTPATIENT)
Dept: BEHAVIORAL HEALTH | Facility: MEDICAL CENTER | Age: 38
End: 2018-09-27

## 2018-09-27 DIAGNOSIS — F32.A DEPRESSIVE DISORDER: ICD-10-CM

## 2018-09-27 DIAGNOSIS — F10.20 ALCOHOL USE DISORDER, SEVERE, DEPENDENCE (HCC): ICD-10-CM

## 2018-09-27 PROCEDURE — G0410 GRP PSYCH PARTIAL HOSP 45-50: HCPCS

## 2018-09-27 PROCEDURE — G0177 OPPS/PHP; TRAIN & EDUC SERV: HCPCS

## 2018-09-27 PROCEDURE — 90853 GROUP PSYCHOTHERAPY: CPT | Performed by: MARRIAGE & FAMILY THERAPIST

## 2018-09-27 NOTE — H&P
Group Therapy Checklist  Attendance Duration (min): 46-60 (90 min.)  Number of Participants: 6  Program/Group: Partial Hospital Program  Topics Covered: Relationships in Recovery  Participation: Active verbal participation, Attentive, Supportive to other group members, Open to feedback (Pt processed her ;s control in their relationshhip.)  Affect/Mood Range: Normal range, Flexible  Affect/Mood Display: Congruent w/content, Tearful  Cognition: Alert, Oriented  Evidence of Imminent Suicide Risk: No  Evidence of imminent homicide risk: No  Therapeutic Interventions: Emotion clarification, Cognitive clarification, Values clarification  Progress Toward Treatment Goal: Moderate improvement

## 2018-09-27 NOTE — BH THERAPY
Attendance: Attended   Attendance Duration (min): 60  Number of Participants: 6     Program/Group: Partial Hospital Program  Topics Covered: Relationships in Recovery  Participation: Active verbal participation, Attentive, Supportive to other group members, Open to feedback  Affect/Mood Range: Normal range, Flexible  Affect/Mood Display: Congruent w/content, Tearful  Cognition: Alert, Oriented  Evidence of Imminent Suicide Risk: No  Evidence of imminent homicide risk: No  Therapeutic Interventions: Emotion clarification, Cognitive clarification, Values clarification  Progress Toward Treatment Goal: Moderate improvement
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
## 2018-09-28 LAB — VIT B1 BLD-MCNC: 189 NMOL/L (ref 70–180)

## 2018-10-02 ENCOUNTER — HOSPITAL ENCOUNTER (OUTPATIENT)
Dept: BEHAVIORAL HEALTH | Facility: MEDICAL CENTER | Age: 38
End: 2018-10-02
Attending: PSYCHIATRY & NEUROLOGY
Payer: COMMERCIAL

## 2018-10-02 DIAGNOSIS — F10.20 ALCOHOL USE DISORDER, SEVERE, DEPENDENCE (HCC): ICD-10-CM

## 2018-10-02 DIAGNOSIS — F32.A DEPRESSIVE DISORDER: ICD-10-CM

## 2018-10-02 PROCEDURE — G0177 OPPS/PHP; TRAIN & EDUC SERV: HCPCS

## 2018-10-02 PROCEDURE — G0176 OPPS/PHP;ACTIVITY THERAPY: HCPCS

## 2018-10-02 PROCEDURE — G0410 GRP PSYCH PARTIAL HOSP 45-50: HCPCS

## 2018-10-02 NOTE — H&P
Group Therapy Checklist  Attendance: Attended  Attendance Duration (min):  (60 min.)  Number of Participants: 9  Program/Group: Partial Hospital Program  Topics Covered: Forgiveness  Participation: Active verbal participation  Affect/Mood Range: Normal range  Affect/Mood Display: Congruent w/content  Cognition: Oriented, Alert  Evidence of Imminent Suicide Risk: No  Evidence of imminent homicide risk: No  Therapeutic Interventions: Psychoeducation re: (Comment), Emotion clarification  Progress Toward Treatment Goal: Moderate improvement

## 2018-10-02 NOTE — BH THERAPY
Group Therapy Checklist  Attendance Duration (min): Greater than 60 (60 min.)  Number of Participants: 2  Program/Group: Partial Hospital Program  Topics Covered: Other (Comment): (activity therapy)  Participation: Active verbal participation, Attentive, Supportive to other group members, Open to feedback  Affect/Mood Range: Normal range, Flexible  Affect/Mood Display: Congruent w/content  Cognition: Alert, Oriented  Evidence of Imminent Suicide Risk: No  Evidence of imminent homicide risk: No  Therapeutic Interventions: Emotion clarification, Supportive psychotherapy, Leisure and Recreation skills  Progress Toward Treatment Goal: Moderate improvement

## 2018-10-02 NOTE — ADDENDUM NOTE
Encounter addended by: Carmen Kincaid R.N. on: 10/2/2018  2:16 PM<BR>    Actions taken: Sign clinical note

## 2018-10-02 NOTE — BH THERAPY
10-11:30  Attendance: Attended   Attendance Duration (min): 90  Number of Participants: 9     Program/Group: Partial Hospital Program  Topics Covered: Other (Comment): (process group)  Participation: Active verbal participation, Attentive, Supportive to other group members, Open to feedback  Affect/Mood Range: Normal range, Flexible  Affect/Mood Display: Congruent w/content  Cognition: Alert, Oriented  Evidence of Imminent Suicide Risk: No  Evidence of imminent homicide risk: No  Therapeutic Interventions: Emotion clarification, Supportive psychotherapy  Progress Toward Treatment Goal: Moderate improvement    Processed forgiveness and explored life on life's terms developing coping skills with emotional insights. Receptive to peer support.

## 2018-10-02 NOTE — BH THERAPY
[id number]  Attendance: Attended   Attendance Duration (min): 60  Number of Participants: 2     Program/Group: Partial Hospital Program  Topics Covered: self affirmations  Participation: Active verbal participation, Attentive, Supportive to other group members, Open to feedback  Affect/Mood Range: Normal range, Flexible  Affect/Mood Display: Congruent w/content  Cognition: Alert, Oriented  Evidence of Imminent Suicide Risk: No  Evidence of imminent homicide risk: No  Therapeutic Interventions: Mindfulness exercise. Supportive psychotherapy  Progress Toward Treatment Goal: Moderate improvement

## 2018-10-03 ENCOUNTER — HOSPITAL ENCOUNTER (OUTPATIENT)
Dept: BEHAVIORAL HEALTH | Facility: MEDICAL CENTER | Age: 38
End: 2018-10-03
Attending: PSYCHIATRY & NEUROLOGY
Payer: COMMERCIAL

## 2018-10-03 DIAGNOSIS — F10.20 ALCOHOL USE DISORDER, SEVERE, DEPENDENCE (HCC): ICD-10-CM

## 2018-10-03 DIAGNOSIS — F32.A DEPRESSIVE DISORDER: ICD-10-CM

## 2018-10-03 PROCEDURE — G0176 OPPS/PHP;ACTIVITY THERAPY: HCPCS

## 2018-10-03 PROCEDURE — G0177 OPPS/PHP; TRAIN & EDUC SERV: HCPCS

## 2018-10-03 PROCEDURE — G0411 INTER ACTIVE GRP PSYCH PARTI: HCPCS

## 2018-10-03 NOTE — BH THERAPY
[id number]  Attendance: Attended   Attendance Duration (min): 60  Number of Participants: 7     Program/Group: Partial Hospital Program  Topics Covered: Caretaking, Caring in Relationships  Participation: Active verbal participation, Attentive  Affect/Mood Range: Normal range, Flexible  Affect/Mood Display: Congruent w/content  Cognition: Alert, Oriented  Evidence of Imminent Suicide Risk: No  Evidence of imminent homicide risk: No  Therapeutic Interventions: Cognitive clarification, Emotion clarification  Progress Toward Treatment Goal: Moderate improvement

## 2018-10-03 NOTE — BH THERAPY
[id number]  Attendance: Attended   Attendance Duration (min): 46-60  Number of Participants: 2     Program/Group: Partial Hospital Program  Topics Covered: Other (Comment): (creative art)  Participation: Active verbal participation, Attentive  Affect/Mood Range: Normal range, Flexible  Affect/Mood Display: Congruent w/content  Cognition: Alert, Oriented  Evidence of Imminent Suicide Risk: No  Evidence of imminent homicide risk: No  Therapeutic Interventions: Socialization, Leisure and Recreation skills  Progress Toward Treatment Goal: Moderate improvement

## 2018-10-03 NOTE — BH THERAPY
[id number]  Attendance: Attended   Attendance Duration (min): 60  Number of Participants: 2     Program/Group: Partial Hospital Program  Topics Covered: Depression Recovery  Participation: Active verbal participation, Attentive, Supportive to other group members  Affect/Mood Range: Normal range, Flexible  Affect/Mood Display: Congruent w/content  Cognition: Alert, Oriented  Evidence of Imminent Suicide Risk: No  Evidence of imminent homicide risk: No  Therapeutic Interventions: Emotion clarification  Progress Toward Treatment Goal: Moderate improvement

## 2018-10-03 NOTE — CARE PLAN
Problem: Substance Induced Symptoms  Goal: Abstinence  PHP: 5 days weekly M-F 9a-3p pt will abstain from all drugs of abuse while in program and by applying recovery skills with active 12 step involvement aeb verbalizing abstinence and sobriety progress at individual sessions.      Intervention: Individual Counseling Sessions   [facility]  Therapy Progress Note    Patient Name: Kenisha Domingo  Patient MRN: [id number]  Today's Date: 10/3/2018     Type of session:Individual psychotherapy  Length of session: 30 minutes  Persons in attendance:Patient    Subjective/New Info: individual session. Processed relationship challenges, communication without blame, codependency and self care addressed. Gloria and  working through her recovery, reinforcing kindness, space and calm. Last night was loving exchange which gives her hope. She is sober.     Objective/Observations:   Participation: Active verbal participation, Attentive, Engaged and Open to feedback   Grooming: Casual and Neat   Cognition: Alert and Fully Oriented   Eye contact: Good   Mood: Anxious   Affect: Flexible, Full range and Labile   Thought process: Logical and Goal-directed   Speech: Pressured   Other:     Diagnoses:   1. Alcohol use disorder, severe, dependence (HCC)    2. Depressive disorder         Current risk:   SUICIDE: Not applicable   Homicide: Not applicable   Self-harm: Not applicable   Relapse: Low   Other:    Safety Plan reviewed? Not Indicated   If evidence of imminent risk is present, intervention/plan:     Therapeutic Intervention(s): Clarify:  Clarify feelings, Communication skills, Conflict clarification, Distress tolerance skills, Review treatment plan, Self-care skills and Supportive psychotherapy    Treatment Goal(s)/Objective(s) addressed: relapse prevention, communication, self care, conflict resolution     Progress toward Treatment Goals: Moderate improvement    Plan:  - Continue Partial Hospital  "Program  - \"Homework\" recommendation: steps one and two  - Next appointment scheduled:  10/4/2018  - Patient is in agreement with the above plan:  YES    Carmen Kincaid R.N.  10/3/2018                                       "

## 2018-10-04 ENCOUNTER — HOSPITAL ENCOUNTER (OUTPATIENT)
Dept: BEHAVIORAL HEALTH | Facility: MEDICAL CENTER | Age: 38
End: 2018-10-04
Attending: PSYCHIATRY & NEUROLOGY
Payer: COMMERCIAL

## 2018-10-04 VITALS
DIASTOLIC BLOOD PRESSURE: 87 MMHG | HEIGHT: 61 IN | HEART RATE: 65 BPM | WEIGHT: 132 LBS | BODY MASS INDEX: 24.92 KG/M2 | SYSTOLIC BLOOD PRESSURE: 124 MMHG

## 2018-10-04 DIAGNOSIS — F10.20 ALCOHOL USE DISORDER, SEVERE, DEPENDENCE (HCC): ICD-10-CM

## 2018-10-04 DIAGNOSIS — F41.9 ANXIETY DISORDER, UNSPECIFIED TYPE: ICD-10-CM

## 2018-10-04 DIAGNOSIS — F32.A DEPRESSIVE DISORDER: ICD-10-CM

## 2018-10-04 PROCEDURE — 99214 OFFICE O/P EST MOD 30 MIN: CPT | Performed by: PSYCHIATRY & NEUROLOGY

## 2018-10-04 PROCEDURE — G0177 OPPS/PHP; TRAIN & EDUC SERV: HCPCS

## 2018-10-04 PROCEDURE — G0410 GRP PSYCH PARTIAL HOSP 45-50: HCPCS

## 2018-10-04 NOTE — BH THERAPY
[id number]  Attendance: Attended   Attendance Duration (min): 60  Number of Participants: 8     Program/Group: Partial Hospital Program  Topics Covered: Stress Management  Participation: Active verbal participation, Attentive  Affect/Mood Range: Normal range, Flexible  Affect/Mood Display: Congruent w/content  Cognition: Alert, Oriented  Evidence of Imminent Suicide Risk: No  Evidence of imminent homicide risk: No  Therapeutic Interventions: Cognitive clarification, Emotion clarification  Progress Toward Treatment Goal: Moderate improvement

## 2018-10-04 NOTE — BH THERAPY
Group Therapy Checklist  Attendance: Attended  Attendance Duration (min):  (90 min.)  Number of Participants: 8  Program/Group: Partial Hospital Program  Topics Covered:  (Group Therapy)  Participation: Active verbal participation, Attentive, Supportive to other group members, Open to feedback (Pt was able to relate toa  peer's story of isolation and depression and alcoholism and was supportive to her. )  Affect/Mood Range: Constricted  Affect/Mood Display: Congruent w/content  Cognition: Oriented, Alert  Evidence of Imminent Suicide Risk: No  Evidence of imminent homicide risk: No  Therapeutic Interventions: Emotion clarification, Cognitive clarification  Progress Toward Treatment Goal: Moderate improvement

## 2018-10-04 NOTE — PROGRESS NOTES
"   PARTIAL HOSPITALIZATION PROGRAM FOLLOW-UP NOTE      Chief Complaint   Patient presents with   • Follow-Up     alcohol use, anxiety, depression         History Of Present Illness:  Kenisha Domingo is a 38 y.o. old female with alcohol use disorder, depressive disorder, anxiety disorder seen today as PHP follow up, was last seen 1 week ago.  She reports doing good since her last visit with me.  She continues to be sober from alcohol and is feeling good about her sobriety.  She had a better weekend compared to the previous one.  She continues to work with her  on their marriage and it has improved slightly.  She has been thinking about going back to work when she is done with the program and would like to switch her timings to daytime rather than the swing shift.  She feels that her chances of relapsing back on alcohol if she works a swing shift is \"75%\"..  She has worked at [employer] for 17+ years and has good support from her supervisors and feels that she might be able to get the time that she wants.  However, her  and her daughter work the same swing shift and she is not sure if her  would like that or not.  She would like to have a conversation with him to discuss this but is not sure about his response.  She has been sleeping a lot better and denies any problems with her appetite.  She has been compliant with Prozac and feels that it seems to be helping her depression and anxiety.  She denies unprovoked crying spells.  She continues to keep in touch with her support system which includes a few friends, her daughter, her brothers and her mother.  She denies having thoughts of wanting to hurt herself or others.    Social History:   She has been  ×2 and  ×1.  She has been  to her second  for 6+ years and lives with him and her 21-year-old-year-old daughter in [place].  She has another 18-year-old son who lives down the street from her.  She has been working at " [employer] for 17+ years and works as a .     Substance Use:  Alcohol - Denies alcohol use since 9/17/18  Nicotine - Smokes 1 PPD  Illicit drugs - Denies     Medications:  Current Outpatient Prescriptions   Medication Sig Dispense Refill   • fluoxetine (PROZAC) 40 MG capsule Take 40 mg by mouth every day.     • ASPIRIN 81 PO Take  by mouth.       No current facility-administered medications for this encounter.        Review Of Systems:    Constitutional - Negative for fatigue  Respiratory - Negative for shortness of breath, cough  CVS - Negative for chest pain, palpitations  GI - Negative for nausea, vomiting, abdominal pain, diarrhea, constipation  Musculoskeletal - Negative for back pain  Neurological - Negative for headaches  Psychiatric - Positive for anxiety, depression    Physical Examination:  Vital signs: /87   Pulse 65   Ht 1.549 m (5' 1\")   Wt 59.9 kg (132 lb)   LMP 09/06/2018 (Exact Date)   BMI 24.94 kg/m²     Musculoskeletal: Normal gait. No abnormal movements.     Mental Status Evaluation:   General: Young female, dressed in casual attire, good grooming and hygiene, in no apparent distress, calm and cooperative, good eye contact, no psychomotor agitation or retardation  Orientation: Alert and oriented to person, place and time  Recent and remote memory: Grossly intact  Attention span and concentration: Grossly intact  Speech: Spontaneous, normal rate, rhythm and tone  Thought Process: Linear, logical and goal directed  Thought Content: Denies suicidal or homicidal ideations, intent or plan  Perception: Denies auditory or visual hallucinations. No delusions noted  Associations: Intact  Language: Appropriate  Fund of knowledge and vocabulary: Grossly adequate  Mood: \"doing better\"  Affect: Anxious at times, mood congruent  Insight: Good  Judgment: Good      Impression:  1. Alcohol use disorder, severe (last alcohol use 9/17/18)  2. Depressive disorder, unspecified " type  3. Anxiety disorder, unspecified type  4. Rule out posttraumatic stress disorder    Plan:  1. Continue Prozac 40 mg daily for depression and anxiety  2. Encouraged to maintain sobriety from alcohol  3. I have urged her to talk with her family and her workplace about switching her working hours to morning which will decrease her chances of relapsing on alcohol.    I certify that continued Partial Hospitalization services are medically necessary to improve and/or maintain the patient's condition, functional level, prevent relapse or hospitalization and that this could not be done at a less intensive level of care due to:     [x] Persistence of significant psychiatric symptomology.  [x] Severity of patient's condition necessitates a continued intense level of service responsive to the patient's problems and accommodated by the function of the treatment program.    [x] Individualized, active treatment plan goals which are directed toward alleviation of the symptomology that precipitated the admission have not yet been attained.     Follow up in 1 week or sooner if symptoms worsen    The proposed treatment plan was discussed with the patient who was provided the opportunity to ask questions and make suggestions regarding alternative treatment. Patient verbalized understanding and expressed agreement with the plan.     Colleen Delgadillo M.D.  10/04/18      This note was created using voice recognition software (Dragon). The accuracy of the dictation is limited by the abilities of the software. I have reviewed the note prior to signing, however some errors in grammar and context are still possible. If you have any questions related to this note please do not hesitate to contact our office.

## 2018-10-04 NOTE — BH THERAPY
[id number]  Attendance: Attended   Attendance Duration (min): 60  Number of Participants: 3     Program/Group: Partial Hospital Program  Topics Covered: One Day at a Time  Participation: Active verbal participation, Attentive  Affect/Mood Range: Normal range, Flexible  Affect/Mood Display: Congruent w/content  Cognition: Alert, Oriented  Evidence of Imminent Suicide Risk: No  Evidence of imminent homicide risk: No  Therapeutic Interventions: Relapse prevention  Progress Toward Treatment Goal: Moderate improvement

## 2018-10-05 ENCOUNTER — HOSPITAL ENCOUNTER (OUTPATIENT)
Dept: BEHAVIORAL HEALTH | Facility: MEDICAL CENTER | Age: 38
End: 2018-10-05
Attending: PSYCHIATRY & NEUROLOGY
Payer: COMMERCIAL

## 2018-10-05 ENCOUNTER — TELEPHONE (OUTPATIENT)
Dept: BEHAVIORAL HEALTH | Facility: MEDICAL CENTER | Age: 38
End: 2018-10-05

## 2018-10-05 DIAGNOSIS — F32.A DEPRESSIVE DISORDER: ICD-10-CM

## 2018-10-05 DIAGNOSIS — F10.20 ALCOHOL USE DISORDER, SEVERE, DEPENDENCE (HCC): ICD-10-CM

## 2018-10-05 PROCEDURE — G0410 GRP PSYCH PARTIAL HOSP 45-50: HCPCS

## 2018-10-05 PROCEDURE — G0177 OPPS/PHP; TRAIN & EDUC SERV: HCPCS

## 2018-10-05 PROCEDURE — G0176 OPPS/PHP;ACTIVITY THERAPY: HCPCS

## 2018-10-05 NOTE — BH THERAPY
[id number]  Attended 60 minutes  Topic: Creative Art  Active verbal participation, attentive  Affect/mood: normal range, flexible  Affect/mood display: congruent  Cognition: alert, oriented  Evidence of suicide risk: No  Therapeutic intervention: socialization, leisure and recreational activity  Moderate improvement  Active participation in art therapy.

## 2018-10-05 NOTE — BH THERAPY
[id number]  Attendance: Attended   Attendance Duration (min): 60  Number of Participants: 6     Program/Group: Partial Hospital Program  Topics Covered: Med aspects [place]  Participation: Attentive  Affect/Mood Range: Normal range, Flexible  Affect/Mood Display: Congruent w/content  Cognition: Alert, Oriented  Evidence of Imminent Suicide Risk: No  Evidence of imminent homicide risk: No  Therapeutic Interventions: Cognitive clarification, Relapse prevention  Progress Toward Treatment Goal: Moderate improvement

## 2018-10-05 NOTE — BH THERAPY
Group Therapy Checklist  Attendance: Attended  Attendance Duration (min):  (90 min.)  Number of Participants: 6  Program/Group: Partial Hospital Program  Topics Covered: Weekend planning  Participation: Active verbal participation, Attentive, Open to feedback (Pt expressed anger at finding out the one time she saw her birth father was when she was 5 years old as he was in town to sign the termination of parental rights relinquishment.  SHe was angry and sad. )  Affect/Mood Range: Normal range  Affect/Mood Display: Congruent w/content, Sad, Angry  Cognition: Oriented, Alert  Evidence of Imminent Suicide Risk: No  Evidence of imminent homicide risk: No  Therapeutic Interventions: Emotion clarification, Cognitive clarification  Progress Toward Treatment Goal: Moderate improvement

## 2018-10-05 NOTE — TELEPHONE ENCOUNTER
[facility]  TRANSFER/DISCHARGE SUMMARY FORM    HHPI / SCP: yes Other Ins.: no     Patient Name: Kenisha Domingo  Admission Date: 18  Level of Care Attended:  Partial Hosp : 1980  Transfer/Discharge Date: MRN: [id number]  10/5/18       SIGNIFICANT FINDINGS/CLINICAL IMPRESSION:   DSM Codes:   PHP    ICD10 Codes:  F10.20, F41.1, F33.1    Additional problems identified via assessment: marriage challenges    Treatment Components in Which Patient Participated (check all that apply):  Education group(s), 1:1 teaching/therapy, Marital/Family Therapy, Medication Management, Group Therapy and 12-Step Group(s)    Summary of Course of Treatment: Active participation all education forums, process groups, activity group and individual counseling sessions.       Condition at Time of Transfer/Discharge: Met treatment goals.    [x] Medications Reviewed with Copy to Patient    Referred to: Refer to [facility]: Intensive Outpatient Program     Patient is in agreement with discharge plan: yes    Carmen Kincaid R.N.

## 2018-10-05 NOTE — BH THERAPY
[id number]  Attendance: Attended   Attendance Duration (min): 60  Number of Participants: 2     Program/Group: Partial Hospital Program  Topics Covered: Other (Comment): (vulnerability)  Participation: Active verbal participation, Attentive, Supportive to other group members  Affect/Mood Range: Normal range, Flexible  Affect/Mood Display: Congruent w/content  Cognition: Alert, Oriented  Evidence of Imminent Suicide Risk: No  Evidence of imminent homicide risk: No  Therapeutic Interventions: Emotion clarification, Values clarification  Progress Toward Treatment Goal: Moderate improvement

## 2018-10-08 ENCOUNTER — HOSPITAL ENCOUNTER (OUTPATIENT)
Dept: BEHAVIORAL HEALTH | Facility: MEDICAL CENTER | Age: 38
End: 2018-10-08
Attending: PSYCHIATRY & NEUROLOGY
Payer: COMMERCIAL

## 2018-10-08 DIAGNOSIS — F10.20 ALCOHOL USE DISORDER, SEVERE, DEPENDENCE (HCC): ICD-10-CM

## 2018-10-08 DIAGNOSIS — F32.A DEPRESSIVE DISORDER: ICD-10-CM

## 2018-10-08 PROCEDURE — 90853 GROUP PSYCHOTHERAPY: CPT

## 2018-10-08 NOTE — BH THERAPY
[id number]  Attendance: Attended   Attendance Duration (min): 60  Number of Participants: 9     Program/Group: Intensive Outpatient Program  Topics Covered: Relapse Prevention  Participation: Active verbal participation, Attentive  Affect/Mood Range: Normal range, Flexible  Affect/Mood Display: Congruent w/content  Cognition: Alert, Oriented  Evidence of Imminent Suicide Risk: No  Evidence of imminent homicide risk: No  Therapeutic Interventions: Cognitive clarification, Relapse prevention  Progress Toward Treatment Goal: Moderate improvement

## 2018-10-08 NOTE — CARE PLAN
Problem: Substance Induced Symptoms  Goal: Stable mood and functioning    Intervention: Individual Counseling Sessions   [facility]  Therapy Progress Note    Patient Name: Kenisha Domingo  Patient MRN: [id number]  Today's Date: 10/8/2018     Type of session:Individual psychotherapy  Length of session: 45 minutes  Persons in attendance:Patient    Subjective/New Info: individual session. Processed the ups and downs of her relationship with . She states he continues to loop on her past, angry at her decisions. Couples session scheduled for Thursday. She did not complete the assignments.     Objective/Observations:   Participation: Active verbal participation, Attentive, Engaged and Open to feedback   Grooming: Casual and Neat   Cognition: Alert and Fully Oriented   Eye contact: Good   Mood: Depressed, Anxious and Angry   Affect: Flexible, Full range, Congruent with content and Anxious   Thought process: Logical and Goal-directed   Speech: Rate within normal limits and Volume within normal limits   Other:     Diagnoses:   1. Alcohol use disorder, severe, dependence (HCC)    2. Depressive disorder         Current risk:   SUICIDE: Not applicable   Homicide: Not applicable   Self-harm: Not applicable   Relapse: Low   Other:    Safety Plan reviewed? Not Indicated   If evidence of imminent risk is present, intervention/plan:     Therapeutic Intervention(s): Conflict resolution skills, Distress tolerance skills, Interpersonal effectiveness skills, Self-care skills, Stressors assessed and Supportive psychotherapy    Treatment Goal(s)/Objective(s) addressed: relationships in recovery, conflict management. Relapse prevention.      Progress toward Treatment Goals: Moderate improvement    Plan:  - Continue Intensive Outpatient Program  - Patient is in agreement with the above plan:  YES    Carmen Kincadi R.N.  10/8/2018

## 2018-10-08 NOTE — ADDENDUM NOTE
Encounter addended by: Carmen Kincaid R.N. on: 10/8/2018  4:07 PM<BR>    Actions taken: Care Plan problems brought forward, Care Plan modified, Care Plan progress modified, Sign clinical note

## 2018-10-09 ENCOUNTER — HOSPITAL ENCOUNTER (OUTPATIENT)
Dept: BEHAVIORAL HEALTH | Facility: MEDICAL CENTER | Age: 38
End: 2018-10-09
Attending: PSYCHIATRY & NEUROLOGY
Payer: COMMERCIAL

## 2018-10-09 DIAGNOSIS — F10.20 ALCOHOL USE DISORDER, SEVERE, DEPENDENCE (HCC): ICD-10-CM

## 2018-10-09 DIAGNOSIS — F32.A DEPRESSIVE DISORDER: ICD-10-CM

## 2018-10-09 PROCEDURE — 90853 GROUP PSYCHOTHERAPY: CPT

## 2018-10-09 NOTE — BH THERAPY
Group Therapy Checklist  Attendance: Attended  Attendance Duration (min):  (60 min.)  Number of Participants: 9  Program/Group: Intensive Outpatient Program  Topics Covered: Journal writing  Participation: Active verbal participation, Attentive, Supportive to other group members  Affect/Mood Range: Normal range  Affect/Mood Display: Congruent w/content  Cognition: Oriented, Alert  Evidence of Imminent Suicide Risk: No  Evidence of imminent homicide risk: No  Therapeutic Interventions: Psychoeducation re: (Comment), Emotion clarification  Progress Toward Treatment Goal: Moderate improvement

## 2018-10-09 NOTE — BH THERAPY
Group Therapy Checklist  Attendance: Attended  Attendance Duration (min): 90  Number of Participants: 9  Program/Group: Intensive Outpatient Program  Topics Covered:  (process group)  Participation: Active verbal participation, Attentive, Supportive to other group members, Open to feedback  Affect/Mood Range: Normal range, Flexible  Affect/Mood Display: Congruent w/content  Cognition: Alert, Oriented  Evidence of Imminent Suicide Risk: No  Evidence of imminent homicide risk: No  Therapeutic Interventions: Emotion clarification, Supportive psychotherapy  Progress Toward Treatment Goal: Moderate improvement  Explored the archetype of the rebeka in relation to current fears and self esteem issues that block empowerment and success. Receptive to peer support.

## 2018-10-10 ENCOUNTER — HOSPITAL ENCOUNTER (OUTPATIENT)
Dept: BEHAVIORAL HEALTH | Facility: MEDICAL CENTER | Age: 38
End: 2018-10-10
Attending: PSYCHIATRY & NEUROLOGY
Payer: COMMERCIAL

## 2018-10-10 DIAGNOSIS — F32.A DEPRESSIVE DISORDER: ICD-10-CM

## 2018-10-10 DIAGNOSIS — F10.20 ALCOHOL USE DISORDER, SEVERE, DEPENDENCE (HCC): ICD-10-CM

## 2018-10-10 PROCEDURE — 90853 GROUP PSYCHOTHERAPY: CPT

## 2018-10-10 NOTE — BH THERAPY
"Group Therapy Checklist  Attendance: Attended  Attendance Duration (min): 60  Number of Participants: 10  Program/Group: Intensive Outpatient Program  Topics Covered: \"Pieces of Silence\"  Participation: Attentive  Affect/Mood Range: Normal range, Flexible  Affect/Mood Display: Congruent w/content  Cognition: Alert, Oriented  Evidence of Imminent Suicide Risk: No  Evidence of imminent homicide risk: No  Therapeutic Interventions: Cognitive clarification, Relapse prevention  Progress Toward Treatment Goal: Moderate improvement    "

## 2018-10-11 ENCOUNTER — HOSPITAL ENCOUNTER (OUTPATIENT)
Dept: BEHAVIORAL HEALTH | Facility: MEDICAL CENTER | Age: 38
End: 2018-10-11
Attending: PSYCHIATRY & NEUROLOGY
Payer: COMMERCIAL

## 2018-10-11 DIAGNOSIS — F10.20 ALCOHOL USE DISORDER, SEVERE, DEPENDENCE (HCC): ICD-10-CM

## 2018-10-11 DIAGNOSIS — F10.20 ACUTE ALCOHOLISM (HCC): ICD-10-CM

## 2018-10-11 DIAGNOSIS — F32.A DEPRESSIVE DISORDER: ICD-10-CM

## 2018-10-11 PROCEDURE — 90853 GROUP PSYCHOTHERAPY: CPT | Performed by: MARRIAGE & FAMILY THERAPIST

## 2018-10-11 PROCEDURE — 90834 PSYTX W PT 45 MINUTES: CPT

## 2018-10-11 NOTE — BH THERAPY
Group Therapy Checklist  Attendance: Attended  Attendance Duration (min): 60  Number of Participants: 10  Program/Group: Intensive Outpatient Program  Topics Covered: Dual Diagnosis  Participation: Active verbal participation, Attentive  Affect/Mood Range: Normal range, Flexible  Affect/Mood Display: Congruent w/content  Cognition: Alert, Oriented  Evidence of Imminent Suicide Risk: No  Evidence of imminent homicide risk: No  Therapeutic Interventions: Cognitive clarification, Relapse prevention  Progress Toward Treatment Goal: Mild improvement

## 2018-10-11 NOTE — BH THERAPY
Group Therapy Checklist  Attendance: Attended  Attendance Duration (min):  (90 min.)  Number of Participants: 11  Program/Group: Intensive Outpatient Program  Topics Covered:  (Group THerapy)  Participation: Limited verbal participation, Attentive (Pt chose the feeling of 'hurt' to process in therapy today regarding broken trust. )  Affect/Mood Range: Normal range  Affect/Mood Display: Congruent w/content  Cognition: Oriented, Alert  Evidence of Imminent Suicide Risk: No  Evidence of imminent homicide risk: No  Therapeutic Interventions: Emotion clarification, Cognitive clarification  Progress Toward Treatment Goal: Moderate improvement

## 2018-10-11 NOTE — CARE PLAN
Problem: Substance Induced Symptoms  Goal: Abstinence  PHP: 5 days weekly M-F 9a-3p pt will abstain from all drugs of abuse while in program and by applying recovery skills with active 12 step involvement aeb verbalizing abstinence and sobriety progress at individual sessions.      Intervention: Family Counseling Sessions   [facility]  Therapy Progress Note    Patient Name: Kenisha Domingo  Patient MRN: [id number]  Today's Date: 10/11/2018     Type of session:Couples therapy  Length of session: 60 minutes  Persons in attendance:Patient and Spouse/Partner    Subjective/New Info: Jean-Pierre and Gloria for second session. They continue to argue and both feel unheard by the other. Discussed setting aside time each day to check in. Explored his stressors: new position at work, children texting and feeling unappreciated especially by Gloria. He feels their marriage needs to return to values they had prior to her downward spiral. Discussed the disease process of addiction and of depression; he admitted to feeling it was a moral weakness and a curable condition. She feels he is controlling. Both agreed to more kindness in their communication.     Objective/Observations:   Participation: Active verbal participation, Attentive and Engaged   Grooming: Casual and Neat   Cognition: Alert and Fully Oriented   Eye contact: Good   Mood: Anxious, Angry and Irritable   Affect: Flexible, Full range and Congruent with content   Thought process: Logical and Goal-directed   Speech: Rate within normal limits and Volume within normal limits   Other:     Diagnoses: depression and alcohol use disorder    Current risk:   SUICIDE: Not applicable   Homicide: Not applicable   Self-harm: Not applicable   Relapse: Low   Other:    Safety Plan reviewed? Not Indicated   If evidence of imminent risk is present, intervention/plan:     Therapeutic Intervention(s): Clarify:  Clarify values, Communication skills, Conflict clarification,  Conflict resolution skills, Self-care skills, Stressors assessed and Supportive psychotherapy    Treatment Goal(s)/Objective(s) addressed: family roles and communication; conflict resolution     Progress toward Treatment Goals: Mild improvement    Plan:  - Continue Intensive Outpatient Program  - Patient is in agreement with the above plan:  YES    Carmen Kincaid R.N.  10/11/2018

## 2018-10-12 ENCOUNTER — HOSPITAL ENCOUNTER (OUTPATIENT)
Dept: BEHAVIORAL HEALTH | Facility: MEDICAL CENTER | Age: 38
End: 2018-10-12
Attending: PSYCHIATRY & NEUROLOGY
Payer: COMMERCIAL

## 2018-10-12 DIAGNOSIS — F10.20 ACUTE ALCOHOLISM (HCC): ICD-10-CM

## 2018-10-12 PROCEDURE — 90853 GROUP PSYCHOTHERAPY: CPT | Performed by: MARRIAGE & FAMILY THERAPIST

## 2018-10-12 NOTE — BH THERAPY
Group Therapy Checklist  Attendance Duration (min):  (90 min.)  Number of Participants: 10  Program/Group: Intensive Outpatient Program  Topics Covered:  (Group THerapy)  Participation: Limited verbal participation, Attentive (Pt listened intently and wondered if her  had narcissistic features as that was part of the discussion about this type of people and how they are in relationships. )  Affect/Mood Range: Normal range  Affect/Mood Display: Congruent w/content  Cognition: Oriented, Alert  Evidence of Imminent Suicide Risk: No  Evidence of imminent homicide risk: No  Therapeutic Interventions: Emotion clarification, Cognitive clarification  Progress Toward Treatment Goal: Moderate improvement

## 2018-10-12 NOTE — BH THERAPY
Group Therapy Checklist  Attendance: Attended  Attendance Duration (min): 60  Number of Participants: 10  Program/Group: Intensive Outpatient Program  Topics Covered: Boundaries  Participation: Active verbal participation, Attentive  Affect/Mood Range: Normal range, Flexible  Affect/Mood Display: Congruent w/content  Cognition: Alert, Oriented  Evidence of Imminent Suicide Risk: No  Evidence of imminent homicide risk: No  Therapeutic Interventions: Cognitive clarification, Communication skills, Self-care skills  Progress Toward Treatment Goal: Moderate improvement

## 2018-10-15 ENCOUNTER — HOSPITAL ENCOUNTER (OUTPATIENT)
Dept: BEHAVIORAL HEALTH | Facility: MEDICAL CENTER | Age: 38
End: 2018-10-15
Attending: PSYCHIATRY & NEUROLOGY
Payer: COMMERCIAL

## 2018-10-15 ENCOUNTER — TELEPHONE (OUTPATIENT)
Dept: BEHAVIORAL HEALTH | Facility: MEDICAL CENTER | Age: 38
End: 2018-10-15

## 2018-10-15 DIAGNOSIS — F10.20 ALCOHOL USE DISORDER, SEVERE, DEPENDENCE (HCC): ICD-10-CM

## 2018-10-15 DIAGNOSIS — F32.A DEPRESSIVE DISORDER: ICD-10-CM

## 2018-10-15 PROCEDURE — 90853 GROUP PSYCHOTHERAPY: CPT

## 2018-10-15 NOTE — BH THERAPY
Group Therapy Checklist  Attendance: Attended  Attendance Duration (min): 60  Number of Participants: 7  Topics Covered: Recovery Planning  Participation: Active verbal participation, Attentive  Affect/Mood Range: Normal range, Flexible  Affect/Mood Display: Congruent w/content  Cognition: Alert, Oriented  Evidence of Imminent Suicide Risk: No  Evidence of imminent homicide risk: No  Therapeutic Interventions: Cognitive clarification, Values clarification, Relapse prevention  Progress Toward Treatment Goal: Moderate improvement

## 2018-10-15 NOTE — TELEPHONE ENCOUNTER
[facility]    Treatment Team Staffing    Patient Name: Kenisha Domingo Program: IOP Date: 10/15/2018     Attendees:  Rylee Sauceda RN, [facility]; RACHEAL Noble, [facility]; Carmen Kincaid RN and Colleen Delgadillo MD    Patient's Progress toward Goals Listed on the Treatment Plan: abstinent. Marriage continues to be challenging. She will visit her mom and family next week.  here for two couples sessions, he wants her to be able to drink again.     1. Client's Participation When in Attendance Was: Active in a Positive Way    2. Counselor's Evaluation of Client's Progress: Positive Movement    3. Patient is attending group and individual sessions and is progressing well toward the treatment goals: yes      YES NO   A. Relapse During Program []  [x]    B. Requires physician review []  [x]    C. Referral to program inappropriate []  [x]    D. Non compliance with Treatment Plan []  [x]    E. Early treatment termination (lack of attendance) []  []     []  []      Comments: Gloria is sorting out her feelings around her marriage, hears how others in group are in emotionally abusive relationships and has begun to question her own.     Treatment Plan Review: - Continue Intensive Outpatient Program  - Patient is in agreement with the above plan:  YES

## 2018-10-16 ENCOUNTER — HOSPITAL ENCOUNTER (OUTPATIENT)
Dept: BEHAVIORAL HEALTH | Facility: MEDICAL CENTER | Age: 38
End: 2018-10-16
Attending: PSYCHIATRY & NEUROLOGY
Payer: COMMERCIAL

## 2018-10-16 DIAGNOSIS — F10.20 ACUTE ALCOHOLISM (HCC): ICD-10-CM

## 2018-10-16 PROCEDURE — 90853 GROUP PSYCHOTHERAPY: CPT | Performed by: MARRIAGE & FAMILY THERAPIST

## 2018-10-16 NOTE — CARE PLAN
Problem: Substance Induced Symptoms  Goal: Stable mood and functioning    Intervention: Individual Counseling Sessions   [facility]  Therapy Progress Note    Patient Name: Kenisha Domingo  Patient MRN: [id number]  Today's Date: 10/16/2018     Type of session:Individual psychotherapy  Length of session: 45 minutes  Persons in attendance:Patient    Subjective/New Info: individual session. Gloria leaves for her family home in [place], [place] tomorrow. She is excited to be with family again. She and her  continue to argue over little things. She recognizes early recovery challenges and states she is working at keeping peace at home. She realizes that her shopping episode without telling her  was a childish attempt at getting at him and that they discussed the behavior. She has AA meeting schedule for [place] area.     Objective/Observations:   Participation: Active verbal participation, Attentive, Engaged and Open to feedback   Grooming: Casual and Neat   Cognition: Alert and Fully Oriented   Eye contact: Good   Mood: Euthymic   Affect: Flexible, Full range and Congruent with content   Thought process: Logical and Goal-directed   Speech: Rate within normal limits and Volume within normal limits   Other:     Diagnoses: F33.1 and F10.20    Current risk:   SUICIDE: Not applicable   Homicide: Not applicable   Self-harm: Not applicable   Relapse: Low   Other:    Safety Plan reviewed? Not Indicated   If evidence of imminent risk is present, intervention/plan:     Therapeutic Intervention(s): Behavior:  Behavioral plan developed/modified, Clarify:  Clarify feelings, Conflict resolution skills, Review treatment plan, Self-care skills and Supportive psychotherapy    Treatment Goal(s)/Objective(s) addressed: relapse prevention, communication and conflict resolution     Progress toward Treatment Goals: Moderate improvement    Plan:  - Continue Intensive Outpatient Program  - Next appointment  scheduled:  10/17/2018  - Patient is in agreement with the above plan:  YES    Carmen Kincaid R.N.  10/16/2018

## 2018-10-16 NOTE — BH THERAPY
Group Therapy Checklist  Attendance: Attended  Attendance Duration (min):  (60 min.)  Number of Participants: 10  Program/Group: Intensive Outpatient Program  Topics Covered: Grief & Loss  Participation: Active verbal participation, Attentive  Affect/Mood Range: Normal range  Affect/Mood Display: Congruent w/content  Cognition: Oriented, Alert  Evidence of Imminent Suicide Risk: No  Evidence of imminent homicide risk: No  Therapeutic Interventions: Psychoeducation re: (Comment), Emotion clarification  Progress Toward Treatment Goal: Moderate improvement

## 2018-10-22 ENCOUNTER — TELEPHONE (OUTPATIENT)
Dept: BEHAVIORAL HEALTH | Facility: MEDICAL CENTER | Age: 38
End: 2018-10-22

## 2018-10-24 ENCOUNTER — HOSPITAL ENCOUNTER (OUTPATIENT)
Dept: BEHAVIORAL HEALTH | Facility: MEDICAL CENTER | Age: 38
End: 2018-10-24
Attending: PSYCHIATRY & NEUROLOGY
Payer: COMMERCIAL

## 2018-10-24 DIAGNOSIS — F10.20 ALCOHOL USE DISORDER, SEVERE, DEPENDENCE (HCC): ICD-10-CM

## 2018-10-24 PROCEDURE — 90853 GROUP PSYCHOTHERAPY: CPT

## 2018-10-24 NOTE — BH THERAPY
Group Therapy Checklist  Attendance: Attended  Attendance Duration (min):  (60)  Number of Participants: 9  Program/Group: Intensive Outpatient Program  Topics Covered: Chalk talk  Participation: Attentive  Affect/Mood Range: Normal range, Flexible  Affect/Mood Display: Congruent w/content  Cognition: Alert, Oriented  Evidence of Imminent Suicide Risk: No  Evidence of imminent homicide risk: No  Therapeutic Interventions: Cognitive clarification, Relapse prevention  Progress Toward Treatment Goal: Moderate improvement

## 2018-10-25 ENCOUNTER — HOSPITAL ENCOUNTER (OUTPATIENT)
Dept: BEHAVIORAL HEALTH | Facility: MEDICAL CENTER | Age: 38
End: 2018-10-25
Attending: PSYCHIATRY & NEUROLOGY
Payer: COMMERCIAL

## 2018-10-25 DIAGNOSIS — F10.10 ALCOHOL USE DISORDER, MILD, ABUSE: ICD-10-CM

## 2018-10-25 DIAGNOSIS — F10.20 ALCOHOL USE DISORDER, SEVERE, DEPENDENCE (HCC): ICD-10-CM

## 2018-10-25 PROCEDURE — 90834 PSYTX W PT 45 MINUTES: CPT

## 2018-10-25 PROCEDURE — 90853 GROUP PSYCHOTHERAPY: CPT | Performed by: MARRIAGE & FAMILY THERAPIST

## 2018-10-25 NOTE — BH THERAPY
Group Therapy Checklist  Attendance: Attended  Attendance Duration (min):  (60)  Number of Participants: 6  Program/Group: Intensive Outpatient Program  Topics Covered: Mindfulness, Regulating emotions  Participation: Active verbal participation, Attentive  Affect/Mood Range: Flexible, Normal range  Affect/Mood Display: Congruent w/content  Cognition: Alert, Oriented  Evidence of Imminent Suicide Risk: No  Evidence of imminent homicide risk: No  Therapeutic Interventions: Emotion clarification  Progress Toward Treatment Goal: Moderate improvement

## 2018-10-25 NOTE — BH THERAPY
Group Therapy Checklist  Attendance: Attended  Attendance Duration (min):  (90 min.)  Number of Participants: 6  Program/Group: Intensive Outpatient Program  Topics Covered:  (Group THerapy)  Participation: Active verbal participation  Affect/Mood Range: Normal range (Pt processed nad shared her Personal Bill of Rights.  She shared that she is ovign to live with her family and not bringing her  along. )  Affect/Mood Display: Congruent w/content  Therapeutic Interventions: Emotion clarification, Cognitive clarification  Progress Toward Treatment Goal: Moderate improvement

## 2018-10-25 NOTE — CARE PLAN
Problem: Substance Induced Symptoms  Goal: Stable mood and functioning    Intervention: Relapse Prevention Plan   [facility]  Therapy Progress Note    Patient Name: Kenisha Domingo  Patient MRN: [id number]  Today's Date: 10/25/2018     Type of session:Individual psychotherapy  Length of session: 45 minutes  Persons in attendance:Patient    Subjective/New Info: Laura presents for final one on one session with [facility]. She processed her relapse prevention plan. Laura has made the decision to move to [facility] to be with her family of origin. She is leaving her  in [place], and has not yet decided to leave the marriage all together just wanting a separation today. They discussed it last night. Tomorrow is her last [facility] day, then she will drive her [employer] with her mom to [place]. She feels great relief and peace with this decision. She will look for work in [place] and has possibilities at the [employer] there (she has 17 years at [employer] experience here). She is looking at [employer] in [place] and has found a [employer] for her spiritual growth as well. She enjoyed hiking around the area and hopes to be returning to more activity soon. Reflected on being single; she knows how co-dependent she has been in the past and states she discussed with her sponsor. Gloria has two appointments scheduled with Dr Herron in two and four weeks; today she plans on keeping those. She sees Dr Delgadillo in six weeks for medication management.     Objective/Observations:   Participation: Active verbal participation, Attentive, Engaged and Open to feedback   Grooming: Good, Casual and Neat   Cognition: Alert and Fully Oriented   Eye contact: Good   Mood: Euthymic   Affect: Flexible, Full range and Congruent with content   Thought process: Logical and Goal-directed   Speech: Rate within normal limits and Volume within normal limits   Other:     Diagnoses: No diagnosis found.     Current risk:   SUICIDE: Not  applicable   Homicide: Not applicable   Self-harm: Not applicable   Relapse: Low   Other:    Safety Plan reviewed? Not Indicated   If evidence of imminent risk is present, intervention/plan:     Therapeutic Intervention(s): Clarify:  Clarify feelings, Clarify thoughts and Clarify values, Develop/modify treatment plan, Distress tolerance skills, Leisure and recreation skills, Review treatment plan, Self-care skills, Stressors assessed and Supportive psychotherapy    Treatment Goal(s)/Objective(s) addressed: relapse prevention plan and goals; discontinue treatment plan for program.      Progress toward Treatment Goals: Moderate improvement    Plan:  - Next appointment scheduled:  10/26/2018  - Transition toward termination  - Patient is in agreement with the above plan:  YES    Carmen Kincaid R.N.  10/25/2018

## 2018-10-26 ENCOUNTER — HOSPITAL ENCOUNTER (OUTPATIENT)
Dept: BEHAVIORAL HEALTH | Facility: MEDICAL CENTER | Age: 38
End: 2018-10-26
Attending: PSYCHIATRY & NEUROLOGY
Payer: COMMERCIAL

## 2018-10-26 ENCOUNTER — TELEPHONE (OUTPATIENT)
Dept: BEHAVIORAL HEALTH | Facility: MEDICAL CENTER | Age: 38
End: 2018-10-26

## 2018-10-26 DIAGNOSIS — F10.20 ACUTE ALCOHOLISM (HCC): ICD-10-CM

## 2018-10-26 PROCEDURE — 90853 GROUP PSYCHOTHERAPY: CPT | Performed by: MARRIAGE & FAMILY THERAPIST

## 2018-10-26 NOTE — BH THERAPY
Group Therapy Checklist  Attendance: Attended  Attendance Duration (min):  (60)  Program/Group: Intensive Outpatient Program  Topics Covered: Belief Systems  Participation: Active verbal participation  Affect/Mood Range: Normal range, Flexible  Affect/Mood Display: Congruent w/content  Cognition: Alert, Oriented  Evidence of Imminent Suicide Risk: No  Evidence of imminent homicide risk: No  Therapeutic Interventions: Cognitive clarification, Values clarification  Progress Toward Treatment Goal: Moderate improvement

## 2018-10-26 NOTE — BH THERAPY
Group Therapy Checklist  Attendance: Attended  Attendance Duration (min):  (90 min.)  Number of Participants: 10  Program/Group: Intensive Outpatient Program  Participation: Active verbal participation, Attentive (Pt reviewed with gratitude her 5 week journey in [place] and her move today to leave  and be with her family in [place], [place] as she continues her recovery process. )  Affect/Mood Range: Normal range, Flexible  Affect/Mood Display: Congruent w/content  Cognition: Alert, Oriented  Evidence of imminent homicide risk: No  Therapeutic Interventions: Cognitive clarification, Emotion clarification  Progress Toward Treatment Goal: Moderate improvement

## 2018-10-26 NOTE — TELEPHONE ENCOUNTER
[facility]  TRANSFER/DISCHARGE SUMMARY FORM    HHPI / SCP: yes Other Ins.: no     Patient Name: Kenisha Domingo  Admission Date: 10/8/18  Level of Care Attended:  Intens.OP : 1980  Transfer/Discharge Date: MRN: [id number]  10/26/18       SIGNIFICANT FINDINGS/CLINICAL IMPRESSION:   DSM Codes:   IOP    ICD10 Codes: F33.2, F10.20  Additional problems identified via assessment: marital problems. She and  Jean-Pierre had two couples sessions and tried to work things out. They have agreed to separation today. She is moving to [place], [place] to be with her family. She packed [employer] last night and will leave [place] after group today. She has f/u appts with us and plans to return to [place] in the future to keep them as scheduled.     Treatment Components in Which Patient Participated (check all that apply):  Education group(s), 1:1 teaching/therapy, Marital/Family Therapy, Medication Management, Group Therapy and 12-Step Group(s)    Summary of Course of Treatment: Active participation all education forums, process groups and individual counseling sessions.     Condition at Time of Transfer/Discharge: Met treatment goals.    [x] Medications Reviewed with Copy to Patient    Referred to: Refer to [facility]: Outpatient Therapy and Outpatient Medication Management     Patient is in agreement with discharge plan: yes    Carmen Kincaid R.N.

## 2018-12-13 ENCOUNTER — OFFICE VISIT (OUTPATIENT)
Dept: BEHAVIORAL HEALTH | Facility: CLINIC | Age: 38
End: 2018-12-13
Payer: COMMERCIAL

## 2018-12-13 VITALS
DIASTOLIC BLOOD PRESSURE: 89 MMHG | HEART RATE: 76 BPM | WEIGHT: 141 LBS | HEIGHT: 61 IN | BODY MASS INDEX: 26.62 KG/M2 | SYSTOLIC BLOOD PRESSURE: 126 MMHG

## 2018-12-13 DIAGNOSIS — F41.9 ANXIETY DISORDER, UNSPECIFIED TYPE: ICD-10-CM

## 2018-12-13 DIAGNOSIS — F32.A DEPRESSIVE DISORDER: ICD-10-CM

## 2018-12-13 DIAGNOSIS — F10.20 ALCOHOL USE DISORDER, SEVERE, DEPENDENCE (HCC): ICD-10-CM

## 2018-12-13 PROCEDURE — 99214 OFFICE O/P EST MOD 30 MIN: CPT | Performed by: PSYCHIATRY & NEUROLOGY

## 2018-12-13 PROCEDURE — 90833 PSYTX W PT W E/M 30 MIN: CPT | Performed by: PSYCHIATRY & NEUROLOGY

## 2018-12-13 RX ORDER — FLUOXETINE HYDROCHLORIDE 40 MG/1
40 CAPSULE ORAL DAILY
Qty: 90 CAP | Refills: 0 | Status: SHIPPED | OUTPATIENT
Start: 2018-12-13 | End: 2019-03-13

## 2018-12-13 ASSESSMENT — PATIENT HEALTH QUESTIONNAIRE - PHQ9: CLINICAL INTERPRETATION OF PHQ2 SCORE: 0

## 2018-12-13 NOTE — PROGRESS NOTES
PSYCHIATRY FOLLOW-UP NOTE      Chief Complaint   Patient presents with   • Follow-Up     alcohol use, anxiety, depression         History Of Present Illness:  Kenisha Domingo is a 38 y.o. old female with alcohol use disorder, anxiety disorder, depressive disorder comes in today for follow up, was last seen over 2 months ago.  She reports doing good in regards to her anxiety and depression since her last visit with me.  She continues to be sober from alcohol and will soon be reaching her 3-month sobriety and is proud of it.  She moved to [place], [place] about 6-facundo weeks ago and has been living with her mother and her stepfather.  Her daughter also moved with her and she is happy to have all the support from her extended family.  She is currently taking a break from her relationship.  She was able to get long-term disability until the end of March from her workplace and she is contemplating not returning to work in the [employer] at this time.  She is not sure where she is with her relationship which she is happy that she has a few months to decide.  Her fund did quit alcohol but they have a lot of other issues to work on and she is taking her time to make the decision.  She has been compliant with Prozac and endorses benefit.  She states that for a while she was having headaches and she stopped taking Prozac which worsened her irritability and anxiety.  She is back on the 40 mg of Prozac and endorses benefit.  She denies any current active symptoms of depression or anxiety.  She is sleeping a lot better than before.  Appetite has been good.  She denies feeling angry or irritable at this time.  She denies having thoughts of wanting to hurt herself or others.    Social History:   She has been  ×2 and  ×1.  She has been  to her second  for 6+ years.  She moved to [place], [place] recently and has been living with her mother and stepfather.  Her daughter moved as well and has  "been living along with her.  She has another son who lives in [place].  She has been working at [employer] of her 17+ works and was employed as a  but is currently on long-term disability until end of March 2019.    Substance Use:  Alcohol - Smokes less than 1 PPD  Nicotine - Denies, sober since 9/17/18  Illicit drugs - Denies    Past Medication Trials:  Lexapro x 1 year    Medications:  Current Outpatient Prescriptions   Medication Sig Dispense Refill   • Ibuprofen (MOTRIN PO) Take  by mouth.     • fluoxetine (PROZAC) 40 MG capsule Take 1 Cap by mouth every day for 90 days. 90 Cap 0   • ASPIRIN 81 PO Take  by mouth.       No current facility-administered medications for this visit.        Review Of Systems:    Constitutional - Positive for fatigue  Respiratory - Negative for shortness of breath, cough  CVS - Negative for chest pain, palpitations  GI - Negative for nausea, vomiting, abdominal pain, diarrhea, constipation  Musculoskeletal - Negative for back pain  Neurological - Positive for headaches  Psychiatric - Positive for infrequent anxiety, depression    Physical Examination:  Vital signs: /89   Pulse 76   Ht 1.549 m (5' 1\")   Wt 64 kg (141 lb)   BMI 26.64 kg/m²     Musculoskeletal: Normal gait. No abnormal movements.     Mental Status Evaluation:   General: Young female, dressed in casual attire, good grooming and hygiene, in no apparent distress, calm and cooperative, good eye contact, no psychomotor agitation or retardation  Orientation: Alert and oriented to person, place and time  Recent and remote memory: Grossly intact  Attention span and concentration: Grossly intact  Speech: Spontaneous, normal rate, rhythm and tone  Thought Process: Linear, logical and goal directed  Thought Content: Denies suicidal or homicidal ideations, intent or plan  Perception: Denies auditory or visual hallucinations. No delusions noted  Associations: Intact  Language: Appropriate  Fund of knowledge " "and vocabulary: Grossly adequate  Mood: \"am doing good\"  Affect: Euthymic, mood congruent  Insight: Good  Judgment: Good    Depression screening:  Depression Screen (PHQ-2/PHQ-9) 12/13/2018   PHQ-2 Total Score 0       Interpretation of PHQ-9 Total Score   Score Severity   1-4 No Depression   5-9 Mild Depression   10-14 Moderate Depression   15-19 Moderately Severe Depression   20-27 Severe Depression    Medical Records/Labs/Diagnostic Tests Reviewed:  [place]  records - one drug medication prescriptions found in the last 1 year, no abuse suspected      Impression:  1.  Alcohol use disorder, severe, in early remission (sober since 9/17/18) - stable  2.  Depressive disorder, unspecified type - stable  3.  Anxiety disorder, unspecified type - stable  4.  Rule out PTSD    Plan:  1.  Continue Prozac 40 mg daily for depression and anxiety  2.  Continue to maintain sobriety from alcohol and advised her to connect with AA in [place]  3.  Provided supportive psychotherapy (> 16 minutes).  Discussed her sobriety and how hard she has worked for it.  Advised her to look into pros and cons of coming back to working in the [employer] and being back in a relationship with her  which might increase her chance of relapsing back into alcohol.    Return to clinic in 3 months or sooner if symptoms worsen    The proposed treatment plan was discussed with the patient who was provided the opportunity to ask questions and make suggestions regarding alternative treatment. Patient verbalized understanding and expressed agreement with the plan.     Colleen Delgadillo M.D.  12/13/18    This note was created using voice recognition software (Dragon). The accuracy of the dictation is limited by the abilities of the software. I have reviewed the note prior to signing, however some errors in grammar and context are still possible. If you have any questions related to this note please do not hesitate to contact our office.     "

## 2019-03-17 NOTE — H&P
Patient actively participated in process group.  Addressed active unresolved emotional issues. Taught some emotional skills and techniques to assist with the emotional skill building that relates to their presenting issues and active treatment plan.    Patient had an attendance duration in group of 90 minutes.     female

## 2020-07-20 ENCOUNTER — GYNECOLOGY VISIT (OUTPATIENT)
Dept: OBGYN | Facility: CLINIC | Age: 40
End: 2020-07-20

## 2020-07-20 VITALS — SYSTOLIC BLOOD PRESSURE: 153 MMHG | WEIGHT: 144 LBS | DIASTOLIC BLOOD PRESSURE: 96 MMHG | BODY MASS INDEX: 27.21 KG/M2

## 2020-07-20 DIAGNOSIS — Z00.00 WELL WOMAN EXAM (NO GYNECOLOGICAL EXAM): ICD-10-CM

## 2020-07-20 DIAGNOSIS — Z12.39 SCREENING FOR BREAST CANCER: ICD-10-CM

## 2020-07-20 PROCEDURE — 99386 PREV VISIT NEW AGE 40-64: CPT | Performed by: OBSTETRICS & GYNECOLOGY

## 2020-07-20 SDOH — HEALTH STABILITY: MENTAL HEALTH: HOW OFTEN DO YOU HAVE A DRINK CONTAINING ALCOHOL?: 2-3 TIMES A WEEK

## 2020-07-20 ASSESSMENT — FIBROSIS 4 INDEX: FIB4 SCORE: 0.94

## 2020-07-20 NOTE — PROGRESS NOTES
" Kenisha Morgan l40 y.o.  female presents for Annual Well Woman Exam.    ROS: Patient is feeling well. No dyspnea or chest pain on exertion. No Abdominal pain, change in bowel habits, black or bloody stools. No urinary sx. GYN ROS:normal menses, no abnormal bleeding, pelvic pain or discharge, no breast pain or new or enlarging lumps on self exam, she complains of anxiety and elevated BP, \" just a gyn \" . Denies breast tenderness, mass, discharge, changes in size or contour, or abnormal cyclic discomfort., Negative breast symptoms: tenderness, pain, mass, cyst, discharge No neurological complaints.  Past Medical History:   Diagnosis Date   • Alcohol abuse    • Anxiety    • Depression      Past Surgical History:   Procedure Laterality Date   • PRIMARY C SECTION     • TUBAL COAGULATION LAPAROSCOPIC BILATERAL       Ovarian cyst / Tub: USO , age 22       Patient had pregnancy , with SO, not Hisband , thus gave child to SO , not huisband   Allergy:   Patient has no known allergies.    LMP:       Patient's last menstrual period was 2020 (exact date). .     Menstrual History: menses regular every 28 days  Contraceptive Method:tubal ligation      Objective : The patient appears well, alert and oriented x 3, in no acute distress.  /96 (BP Location: Right arm, Patient Position: Sitting)   Wt 65.3 kg (144 lb)   HEENT Exam: EOMI, CLIVE, no adenopathy or thyromegaly.  Lungs: Clear to Auscultation and Percussion.  Cor: S1 and S2 normal, no murmurs, or rubs   Abdomen: Soft without tenderness, guarding mass or organomegaly.  Extremities: No edema, pulses equal  Neurological: Normal No focal signs  Breast Exam:Inspection negative. No nipple discharge or bleeding. No masses or nodularity palpable, negative findings: normal in size and symmetry, normal contour with no evidence of flattening or dimpling, skin normal, nipples everted without rashes or discharge  Pelvic: External " genitalia,urethral meatus, urethra, bladder and vagina normal. Cervix, uterus and adnexa intact and normal.  Anus and perineum normal. Bimanual and rectovaginal without masses or tenderness., negative findings : no distinct masses   Lab:No results found for this or any previous visit (from the past 336 hour(s)).    Assessment:  well woman    Plan:mammogram  pap smear  return annually or prn  Self Breast Exams  Contraception:none

## 2020-07-20 NOTE — NON-PROVIDER
"New patient here today for well women's exam.  Patient had an unexpected [pregnancy last year and has not followed up since she delivered last year.  C/o having a very \"werid\" sensation in the lower abdomen.  Phone # [phone]  Pharmacy verified.  "

## 2021-05-18 ENCOUNTER — DOCUMENTATION (OUTPATIENT)
Dept: BEHAVIORAL HEALTH | Facility: CLINIC | Age: 41
End: 2021-05-18

## 2021-05-18 ENCOUNTER — HOSPITAL ENCOUNTER (OUTPATIENT)
Dept: BEHAVIORAL HEALTH | Facility: MEDICAL CENTER | Age: 41
End: 2021-05-18
Attending: PSYCHIATRY & NEUROLOGY
Payer: COMMERCIAL

## 2021-05-18 DIAGNOSIS — F10.20 ALCOHOL USE DISORDER, SEVERE, DEPENDENCE (HCC): ICD-10-CM

## 2021-05-18 DIAGNOSIS — F41.1 GENERALIZED ANXIETY DISORDER: ICD-10-CM

## 2021-05-18 DIAGNOSIS — F32.A DEPRESSIVE DISORDER: ICD-10-CM

## 2021-05-18 PROBLEM — Z12.39 SCREENING FOR BREAST CANCER: Status: ACTIVE | Noted: 2021-05-18

## 2021-05-18 PROCEDURE — 90791 PSYCH DIAGNOSTIC EVALUATION: CPT | Performed by: MARRIAGE & FAMILY THERAPIST
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
## 2021-05-27 ENCOUNTER — DOCUMENTATION (OUTPATIENT)
Dept: BEHAVIORAL HEALTH | Facility: CLINIC | Age: 41
End: 2021-05-27

## 2023-09-25 ENCOUNTER — OFFICE VISIT (OUTPATIENT)
Dept: URGENT CARE | Facility: CLINIC | Age: 43
End: 2023-09-25
Payer: COMMERCIAL

## 2023-09-25 ENCOUNTER — APPOINTMENT (OUTPATIENT)
Dept: RADIOLOGY | Facility: IMAGING CENTER | Age: 43
End: 2023-09-25
Attending: NURSE PRACTITIONER
Payer: COMMERCIAL

## 2023-09-25 VITALS
RESPIRATION RATE: 16 BRPM | SYSTOLIC BLOOD PRESSURE: 140 MMHG | BODY MASS INDEX: 22.66 KG/M2 | HEART RATE: 81 BPM | OXYGEN SATURATION: 100 % | TEMPERATURE: 98 F | WEIGHT: 120 LBS | DIASTOLIC BLOOD PRESSURE: 90 MMHG | HEIGHT: 61 IN

## 2023-09-25 DIAGNOSIS — R05.3 CHRONIC COUGH: ICD-10-CM

## 2023-09-25 DIAGNOSIS — R06.02 SOB (SHORTNESS OF BREATH): ICD-10-CM

## 2023-09-25 DIAGNOSIS — R07.89 LEFT CHEST PRESSURE: ICD-10-CM

## 2023-09-25 DIAGNOSIS — J40 BRONCHITIS: ICD-10-CM

## 2023-09-25 DIAGNOSIS — J06.9 VIRAL URI WITH COUGH: ICD-10-CM

## 2023-09-25 DIAGNOSIS — F17.200 SMOKER: ICD-10-CM

## 2023-09-25 PROCEDURE — 3080F DIAST BP >= 90 MM HG: CPT | Performed by: NURSE PRACTITIONER

## 2023-09-25 PROCEDURE — 99204 OFFICE O/P NEW MOD 45 MIN: CPT | Performed by: NURSE PRACTITIONER

## 2023-09-25 PROCEDURE — 71046 X-RAY EXAM CHEST 2 VIEWS: CPT | Mod: TC | Performed by: STUDENT IN AN ORGANIZED HEALTH CARE EDUCATION/TRAINING PROGRAM

## 2023-09-25 PROCEDURE — 3077F SYST BP >= 140 MM HG: CPT | Performed by: NURSE PRACTITIONER

## 2023-09-25 PROCEDURE — 93000 ELECTROCARDIOGRAM COMPLETE: CPT | Performed by: NURSE PRACTITIONER

## 2023-09-25 RX ORDER — ALBUTEROL SULFATE 90 UG/1
1-2 AEROSOL, METERED RESPIRATORY (INHALATION) EVERY 4 HOURS PRN
Qty: 8 G | Refills: 0 | Status: SHIPPED | OUTPATIENT
Start: 2023-09-25

## 2023-09-25 RX ORDER — PREDNISONE 20 MG/1
40 TABLET ORAL DAILY
Qty: 10 TABLET | Refills: 0 | Status: SHIPPED | OUTPATIENT
Start: 2023-09-25 | End: 2023-09-30

## 2023-09-25 RX ORDER — BENZONATATE 200 MG/1
200 CAPSULE ORAL EVERY 8 HOURS PRN
Qty: 30 CAPSULE | Refills: 0 | Status: SHIPPED | OUTPATIENT
Start: 2023-09-25

## 2023-09-25 ASSESSMENT — ENCOUNTER SYMPTOMS
CONSTITUTIONAL NEGATIVE: 1
HEMOPTYSIS: 0
FEVER: 0
SHORTNESS OF BREATH: 1
NERVOUS/ANXIOUS: 1
SPUTUM PRODUCTION: 1
COUGH: 1

## 2023-09-25 ASSESSMENT — VISUAL ACUITY: OU: 1

## 2023-09-25 NOTE — PROGRESS NOTES
Subjective:     Kenisha Domingo is a 43 y.o. female who presents for Cough (Sx 1 year / worsening last 2 weeks / pressure on chest / sob)       Shortness of Breath  This is a new problem. The problem has been gradually worsening. Associated symptoms include chest pain and sputum production. Pertinent negatives include no fever or hemoptysis. She has tried nothing for the symptoms.     Patient with CC of left chest pressure and SOB with worsening cough x 2 weeks. Has had chronic cough x 1 year. Smoker. Denies PMH of chronic lung disease.    Denies recent surgery, hx of DVT, hormonal therapy, or hemoptysis.    Review of Systems   Constitutional: Negative.  Negative for fever and malaise/fatigue.   Respiratory:  Positive for cough, sputum production and shortness of breath. Negative for hemoptysis.    Cardiovascular:  Positive for chest pain.   Psychiatric/Behavioral:  The patient is nervous/anxious (Chronic).    All other systems reviewed and are negative.    Refer to HPI for additional details.    During this visit, appropriate PPE was worn, and hand hygiene was performed.    PMH:  has a past medical history of Alcohol abuse, Anxiety, and Depression.    She has no past medical history of Addisons disease ([facility]), Adrenal disorder ([facility]), Allergy, Anemia, Arrhythmia, Arthritis, Asthma, Blood transfusion without reported diagnosis, Cancer ([facility]), Cataract, CHF (congestive heart failure) ([facility]), Clotting disorder ([facility]), COPD (chronic obstructive pulmonary disease) ([facility]), Cushings syndrome ([facility]), Diabetes ([facility]), Diabetic neuropathy ([facility]), GERD (gastroesophageal reflux disease), Glaucoma, Goiter, Head ache, Heart attack ([facility]), Heart murmur, HIV (human immunodeficiency virus infection) ([facility]), Hyperlipidemia, Hypertension, IBD (inflammatory bowel disease), Kidney disease, Meningitis, Migraine, Muscle disorder, Osteoporosis, Parathyroid disorder ([facility]), Pituitary disease ([facility]), Pulmonary emphysema ([facility]), Seizure  "(HCC), Sickle cell disease (HCC), Stroke (HCC), Suicide attempt (HCC), Thyroid disease, Tuberculosis, or Urinary tract infection.    MEDS:   Current Outpatient Medications:     predniSONE (DELTASONE) 20 MG Tab, Take 2 Tablets by mouth every day for 5 days., Disp: 10 Tablet, Rfl: 0    albuterol 108 (90 Base) MCG/ACT Aero Soln inhalation aerosol, Inhale 1-2 Puffs every four hours as needed for Shortness of Breath (and/or wheezing)., Disp: 8 g, Rfl: 0    benzonatate (TESSALON) 200 MG capsule, Take 1 Capsule by mouth every 8 hours as needed for Cough., Disp: 30 Capsule, Rfl: 0    Ibuprofen (MOTRIN PO), Take  by mouth., Disp: , Rfl:     ALLERGIES: No Known Allergies  SURGHX:   Past Surgical History:   Procedure Laterality Date    PRIMARY C SECTION      TUBAL COAGULATION LAPAROSCOPIC BILATERAL       SOCHX:  reports that she has been smoking. She has never used smokeless tobacco. She reports current alcohol use of about 7.2 oz of alcohol per week. She reports that she does not use drugs.    FH: Per HPI as applicable/pertinent.      Objective:     BP (!) 140/90 (BP Location: Left arm, Patient Position: Sitting, BP Cuff Size: Adult)   Pulse 81   Temp 36.7 °C (98 °F) (Temporal)   Resp 16   Ht 1.549 m (5' 1\")   Wt 54.4 kg (120 lb)   SpO2 100%   BMI 22.67 kg/m²     Physical Exam  Nursing note reviewed.   Constitutional:       General: She is not in acute distress.     Appearance: She is well-developed. She is ill-appearing. She is not toxic-appearing.   HENT:      Head: Normocephalic.   Eyes:      General: Vision grossly intact.      Extraocular Movements: Extraocular movements intact.   Cardiovascular:      Rate and Rhythm: Normal rate and regular rhythm.      Heart sounds: Normal heart sounds.   Pulmonary:      Effort: Pulmonary effort is normal. No respiratory distress.      Breath sounds: Wheezing (LLL) present. No decreased breath sounds.   Musculoskeletal:         General: No deformity. Normal range of motion.      " Cervical back: Normal range of motion.   Skin:     General: Skin is warm and dry.      Coloration: Skin is not pale.   Neurological:      Mental Status: She is alert and oriented to person, place, and time.      Motor: No weakness.   Psychiatric:         Mood and Affect: Mood is anxious.         Behavior: Behavior normal. Behavior is cooperative.     EKG: sinus rhythm 85 , no acute ST abnormalities, no ectopy (no prior comparison)    PERC negative      Assessment/Plan:     1. Left chest pressure  - EKG    2. Chronic cough  - DX-CHEST-2 VIEWS; Future    3. Viral URI with cough  - benzonatate (TESSALON) 200 MG capsule; Take 1 Capsule by mouth every 8 hours as needed for Cough.  Dispense: 30 Capsule; Refill: 0    4. SOB (shortness of breath)  - DX-CHEST-2 VIEWS; Future  - predniSONE (DELTASONE) 20 MG Tab; Take 2 Tablets by mouth every day for 5 days.  Dispense: 10 Tablet; Refill: 0  - albuterol 108 (90 Base) MCG/ACT Aero Soln inhalation aerosol; Inhale 1-2 Puffs every four hours as needed for Shortness of Breath (and/or wheezing).  Dispense: 8 g; Refill: 0    5. Bronchitis  - predniSONE (DELTASONE) 20 MG Tab; Take 2 Tablets by mouth every day for 5 days.  Dispense: 10 Tablet; Refill: 0  - albuterol 108 (90 Base) MCG/ACT Aero Soln inhalation aerosol; Inhale 1-2 Puffs every four hours as needed for Shortness of Breath (and/or wheezing).  Dispense: 8 g; Refill: 0    6. Smoker    X-ray unavailable on site today. Patient will obtain at alternative site.    Chest x-ray:    Details    Reading Physician Reading Date Result Priority   Sukhi Driver M.D.  [phone] 9/25/2023 Urgent Care     Narrative & Impression     9/25/2023 3:10 PM     HISTORY/REASON FOR EXAM:  Chest Pain    TECHNIQUE/EXAM DESCRIPTION AND NUMBER OF VIEWS:  Two views of the chest.     COMPARISON:  None.    FINDINGS:  HEART: Not enlarged.  LUNGS: No areas of air space disease are demonstrated.  PLEURA: No effusion or pneumothorax.        IMPRESSION:      No evidence of acute cardiopulmonary disease           Exam Ended: 09/25/23  3:14 PM Last Resulted: 09/25/23  3:24 PM           Radiology report and images reviewed by myself. Concur with findings.    Vital signs stable, afebrile, no acute distress at this time. Chest x-ray normal. EKG normal. Symptoms most consistent with bronchitis with chronic cough and current smoking status. Worsening symptoms most consistent with self-limiting viral URI vs allergies.    Rx as above sent electronically.    Emphasize supportive measures, rest, fluids and OTC medication PRN. Standard precautions/mask/wash hands. Monitor. Return precautions advised.     Differential diagnosis, natural history, supportive care, over-the-counter symptom management per 's instructions, close monitoring, and indications for immediate follow-up discussed.     All questions answered. Patient agrees with the plan of care.    Discharge summary provided via [employer].    Billing note: moderate complexity and moderate risk. New patient. [id number]. Please refer to LOS tool for details.

## 2024-02-26 ENCOUNTER — OFFICE VISIT (OUTPATIENT)
Dept: URGENT CARE | Facility: CLINIC | Age: 44
End: 2024-02-26
Payer: COMMERCIAL

## 2024-02-26 VITALS
BODY MASS INDEX: 22.66 KG/M2 | HEIGHT: 61 IN | TEMPERATURE: 97.5 F | DIASTOLIC BLOOD PRESSURE: 90 MMHG | RESPIRATION RATE: 18 BRPM | HEART RATE: 78 BPM | SYSTOLIC BLOOD PRESSURE: 130 MMHG | WEIGHT: 120 LBS | OXYGEN SATURATION: 100 %

## 2024-02-26 DIAGNOSIS — J20.9 ACUTE BRONCHITIS, UNSPECIFIED ORGANISM: ICD-10-CM

## 2024-02-26 PROCEDURE — 99213 OFFICE O/P EST LOW 20 MIN: CPT | Performed by: NURSE PRACTITIONER

## 2024-02-26 PROCEDURE — 3075F SYST BP GE 130 - 139MM HG: CPT | Performed by: NURSE PRACTITIONER

## 2024-02-26 PROCEDURE — 3080F DIAST BP >= 90 MM HG: CPT | Performed by: NURSE PRACTITIONER

## 2024-02-26 RX ORDER — PREDNISONE 20 MG/1
TABLET ORAL
Qty: 10 TABLET | Refills: 0 | Status: SHIPPED | OUTPATIENT
Start: 2024-02-26

## 2024-02-26 RX ORDER — ALBUTEROL SULFATE 90 UG/1
2 AEROSOL, METERED RESPIRATORY (INHALATION) EVERY 6 HOURS PRN
Qty: 8.5 G | Refills: 0 | Status: SHIPPED | OUTPATIENT
Start: 2024-02-26

## 2024-02-26 ASSESSMENT — ENCOUNTER SYMPTOMS
NAUSEA: 0
CHILLS: 0
HEADACHES: 0
ORTHOPNEA: 0
WHEEZING: 0
SORE THROAT: 0
FEVER: 0
SPUTUM PRODUCTION: 1
COUGH: 1
EYE DISCHARGE: 0
MYALGIAS: 0
DIARRHEA: 0
SHORTNESS OF BREATH: 0

## 2024-02-26 NOTE — PROGRESS NOTES
Subjective     ALEXEY Domingo is a 43 y.o. female who presents with Other (Possible lung infection started getting worse 8-10 days before )            HPI  New problem.  Patient is a 43-year-old female presents with an 8 to 10-day history of cough.  She also has some laryngitis.  She denies fever, chills, shortness of breath, or wheezing.  She denies any nasal congestion or sore throat.  She has been taking over-the-counter Mucinex for her symptoms with minimal relief.  She does also endorse being an every day smoker.    Patient has no known allergies.  Current Outpatient Medications on File Prior to Visit   Medication Sig Dispense Refill    albuterol 108 (90 Base) MCG/ACT Aero Soln inhalation aerosol Inhale 1-2 Puffs every four hours as needed for Shortness of Breath (and/or wheezing). 8 g 0    benzonatate (TESSALON) 200 MG capsule Take 1 Capsule by mouth every 8 hours as needed for Cough. 30 Capsule 0    Ibuprofen (MOTRIN PO) Take  by mouth.       No current facility-administered medications on file prior to visit.     Social History     Socioeconomic History    Marital status:      Spouse name: Not on file    Number of children: Not on file    Years of education: Not on file    Highest education level: Not on file   Occupational History    Not on file   Tobacco Use    Smoking status: Every Day     Current packs/day: 1.00     Types: Cigarettes    Smokeless tobacco: Never    Tobacco comments:     15 years   Vaping Use    Vaping Use: Never used   Substance and Sexual Activity    Alcohol use: Yes     Alcohol/week: 7.2 oz     Types: 12 Cans of beer per week     Comment: Daily, last alcohol use 9/17/18    Drug use: No    Sexual activity: Yes     Partners: Male     Birth control/protection: Female Sterilization, Male Sterilization   Other Topics Concern    Not on file   Social History Narrative    Not on file     Social Determinants of Health     Financial Resource Strain: Not on file   Food Insecurity:  "Not on file   Transportation Needs: Not on file   Physical Activity: Not on file   Stress: Not on file   Social Connections: Not on file   Intimate Partner Violence: Not on file   Housing Stability: Not on file     Breast Cancer-related family history is not on file.      Review of Systems   Constitutional:  Positive for malaise/fatigue. Negative for chills and fever.   HENT:  Negative for congestion and sore throat.    Eyes:  Negative for discharge.   Respiratory:  Positive for cough and sputum production. Negative for shortness of breath and wheezing.    Cardiovascular:  Negative for chest pain and orthopnea.   Gastrointestinal:  Negative for diarrhea and nausea.   Musculoskeletal:  Negative for myalgias.   Neurological:  Negative for headaches.   Endo/Heme/Allergies:  Negative for environmental allergies.              Objective     BP (!) 130/90 (BP Location: Left arm, Patient Position: Sitting, BP Cuff Size: Adult)   Pulse 78   Temp 36.4 °C (97.5 °F) (Temporal)   Resp 18   Ht 1.549 m (5' 1\")   Wt 54.4 kg (120 lb)   SpO2 100%   BMI 22.67 kg/m²      Physical Exam  Constitutional:       General: She is not in acute distress.     Appearance: Normal appearance. She is well-developed. She is not ill-appearing.   HENT:      Head: Normocephalic.      Right Ear: Tympanic membrane and external ear normal.      Left Ear: Tympanic membrane and external ear normal.      Nose: Mucosal edema present. No rhinorrhea.      Mouth/Throat:      Pharynx: No posterior oropharyngeal erythema.   Eyes:      General:         Right eye: No discharge.         Left eye: No discharge.      Conjunctiva/sclera: Conjunctivae normal.   Cardiovascular:      Rate and Rhythm: Normal rate and regular rhythm.      Heart sounds: Normal heart sounds.   Pulmonary:      Effort: Pulmonary effort is normal.      Breath sounds: Normal breath sounds. No wheezing or rales.   Musculoskeletal:         General: Normal range of motion.      Cervical back: " Normal range of motion and neck supple.   Lymphadenopathy:      Cervical: No cervical adenopathy.   Skin:     General: Skin is warm and dry.   Neurological:      Mental Status: She is alert and oriented to person, place, and time.   Psychiatric:         Behavior: Behavior normal.         Thought Content: Thought content normal.                             Assessment & Plan        1. Acute bronchitis, unspecified organism  predniSONE (DELTASONE) 20 MG Tab    albuterol 108 (90 Base) MCG/ACT Aero Soln inhalation aerosol        Differential diagnosis, natural history, supportive care, and indications for immediate follow-up were discussed.

## 2024-02-29 ENCOUNTER — OFFICE VISIT (OUTPATIENT)
Dept: URGENT CARE | Facility: CLINIC | Age: 44
End: 2024-02-29
Payer: COMMERCIAL

## 2024-02-29 VITALS
BODY MASS INDEX: 22.66 KG/M2 | TEMPERATURE: 97.6 F | HEIGHT: 61 IN | RESPIRATION RATE: 18 BRPM | WEIGHT: 120 LBS | HEART RATE: 92 BPM | OXYGEN SATURATION: 99 % | DIASTOLIC BLOOD PRESSURE: 80 MMHG | SYSTOLIC BLOOD PRESSURE: 132 MMHG

## 2024-02-29 DIAGNOSIS — B96.89 ACUTE BACTERIAL SINUSITIS: Primary | ICD-10-CM

## 2024-02-29 DIAGNOSIS — J06.9 BACTERIAL URI: ICD-10-CM

## 2024-02-29 DIAGNOSIS — B96.89 BACTERIAL URI: ICD-10-CM

## 2024-02-29 DIAGNOSIS — J01.90 ACUTE BACTERIAL SINUSITIS: Primary | ICD-10-CM

## 2024-02-29 PROCEDURE — 3075F SYST BP GE 130 - 139MM HG: CPT

## 2024-02-29 PROCEDURE — 99213 OFFICE O/P EST LOW 20 MIN: CPT

## 2024-02-29 PROCEDURE — 3079F DIAST BP 80-89 MM HG: CPT

## 2024-02-29 RX ORDER — DOXYCYCLINE HYCLATE 100 MG
100 TABLET ORAL 2 TIMES DAILY
Qty: 14 TABLET | Refills: 0 | Status: SHIPPED | OUTPATIENT
Start: 2024-02-29 | End: 2024-03-07

## 2024-02-29 ASSESSMENT — ENCOUNTER SYMPTOMS
DIZZINESS: 0
ABDOMINAL PAIN: 0
NAUSEA: 0
HEADACHES: 0
VOMITING: 0
FEVER: 0
EYE PAIN: 0
EYE DISCHARGE: 0
SORE THROAT: 0
STRIDOR: 0
MYALGIAS: 0
PALPITATIONS: 0
EYE REDNESS: 0
DIARRHEA: 0

## 2024-03-01 ASSESSMENT — ENCOUNTER SYMPTOMS
SINUS PAIN: 1
COUGH: 1
CHILLS: 1
SPUTUM PRODUCTION: 1
HEMOPTYSIS: 0
SHORTNESS OF BREATH: 1
WHEEZING: 1

## 2024-03-01 NOTE — PROGRESS NOTES
Subjective:   Kenisha Domingo is a 43 y.o. female who presents for Congestion (Chest congestion and loss of x 5 days , patient has been sick x 2 weeks )          I introduced myself to the patient and informed them that I am a family nurse practitioner.    HPI:Laura is a 43 year old female who comes in today c/o chest congestion and cough with thick green sputum, sinus pain and pressure with thick green nasal drainage fatigue.. Onset was 2 weeks ago, states it started as a cold.  Patient was seen at urgent care on 2/26/2024, diagnosed with bronchitis and treated with prednisone and albuterol MDI which she is continuing with.  Patient describes symptoms as constant, worsening. They describe the cough as productive with thick green mucus.  She describes the pain as aching and pressure in her sinuses. Aggravating factors include worse at night, worse when lying flat, sinus pain exacerbated by leaning forward. Relieving factors include none. Treatments tried at home include she has been using the albuterol inhaler and taking the prednisone with some effect, states it has helped relieve her wheezing. They describe their symptoms as moderate. States she has had a TL in past, also spouse has had a vasectomy, no chance she could be pregnant.       Review of Systems   Constitutional:  Positive for chills and malaise/fatigue. Negative for fever.   HENT:  Positive for congestion and sinus pain. Negative for ear pain and sore throat.    Eyes:  Negative for pain, discharge and redness.   Respiratory:  Positive for cough, sputum production, shortness of breath and wheezing. Negative for hemoptysis and stridor.    Cardiovascular:  Negative for chest pain and palpitations.   Gastrointestinal:  Negative for abdominal pain, diarrhea, nausea and vomiting.   Genitourinary:  Negative for dysuria.   Musculoskeletal:  Negative for myalgias.   Skin:  Negative for rash.   Neurological:  Negative for dizziness and headaches.  "      Medications: albuterol Aers  benzonatate  MOTRIN PO  predniSONE Tabs     Allergies: Patient has no known allergies.    Problem List: does not have any pertinent problems on file.    Surgical History:  Past Surgical History:   Procedure Laterality Date    PRIMARY C SECTION      TUBAL COAGULATION LAPAROSCOPIC BILATERAL         Past Social Hx:   reports that she has been smoking cigarettes. She has never used smokeless tobacco. She reports current alcohol use of about 7.2 oz of alcohol per week. She reports that she does not use drugs.     Past Family Hx:   family history includes Alcohol abuse in her brother, maternal grandfather, maternal grandmother, maternal uncle, and mother; Anxiety disorder in her brother, maternal aunt, and mother; Dementia in her maternal grandmother; Depression in her brother, daughter, and maternal aunt; Drug abuse in her brother, maternal uncle, and mother; Heart Disease in her mother.     Problem list, medications, and allergies reviewed by myself today in Epic.   I have documented what I find to be significant in regards to past medical, social, family and surgical history  in my HPI or under PMH/PSH/FH review section, otherwise it is noncontributory     Objective:     /80 (BP Location: Left arm, Patient Position: Sitting, BP Cuff Size: Adult)   Pulse 92   Temp 36.4 °C (97.6 °F) (Temporal)   Resp 18   Ht 1.549 m (5' 1\")   Wt 54.4 kg (120 lb)   SpO2 99%   BMI 22.67 kg/m²     During this visit, appropriate PPE was worn, and hand hygiene was performed.    Physical Exam  Vitals reviewed.   Constitutional:       General: She is not in acute distress.     Appearance: Normal appearance. She is not ill-appearing or toxic-appearing.   HENT:      Head: Normocephalic and atraumatic.      Right Ear: Tympanic membrane, ear canal and external ear normal. There is no impacted cerumen.      Left Ear: Tympanic membrane, ear canal and external ear normal. There is no impacted cerumen.    "   Nose: Congestion and rhinorrhea present. Rhinorrhea is purulent.      Right Turbinates: Swollen.      Left Turbinates: Swollen.      Right Sinus: Maxillary sinus tenderness and frontal sinus tenderness present.      Left Sinus: Maxillary sinus tenderness and frontal sinus tenderness present.      Mouth/Throat:      Pharynx: Oropharynx is clear. No oropharyngeal exudate or posterior oropharyngeal erythema.   Eyes:      General: No scleral icterus.        Right eye: No discharge.         Left eye: No discharge.      Conjunctiva/sclera: Conjunctivae normal.      Pupils: Pupils are equal, round, and reactive to light.   Cardiovascular:      Rate and Rhythm: Normal rate and regular rhythm.      Heart sounds: Normal heart sounds. No murmur heard.     No friction rub. No gallop.   Pulmonary:      Effort: Pulmonary effort is normal. No respiratory distress.      Breath sounds: Examination of the right-upper field reveals rhonchi. Examination of the left-upper field reveals rhonchi. Examination of the right-middle field reveals rhonchi. Rhonchi present. No wheezing or rales.   Abdominal:      General: There is no distension.   Musculoskeletal:         General: Normal range of motion.      Cervical back: Normal range of motion. No rigidity.      Right lower leg: No edema.      Left lower leg: No edema.   Lymphadenopathy:      Cervical: No cervical adenopathy.   Skin:     General: Skin is warm and dry.      Coloration: Skin is not jaundiced.   Neurological:      General: No focal deficit present.      Mental Status: She is alert and oriented to person, place, and time. Mental status is at baseline.   Psychiatric:         Mood and Affect: Mood normal.         Behavior: Behavior normal.         Thought Content: Thought content normal.         Judgment: Judgment normal.         Assessment/Plan:     Diagnosis and associated orders:     1. Acute bacterial sinusitis  doxycycline (VIBRAMYCIN) 100 MG Tab      2. Bacterial URI   doxycycline (VIBRAMYCIN) 100 MG Tab         Comments/MDM:     1. Acute bacterial sinusitis  Patient meets Infectious Disease Society of [place] (IDSA)  guidelines for ABRS given duration of symptoms, worsening severity, clinical history and physical exam   We discussed management of sinus infection including -  - supportive care measures such as drinking plenty of fluids, use a humidifier in room at night to keep mucous membranes moist, applying warm compresses to face and forehead may be useful in relieving sinus pain and pressure, using a sinus wash such as the NeilMed Neti bottle several times a day as needed, Flonase daily, OTC second-generation non-sedating antihistamine such as Zyrtec, Allegra, or Loratadine daily, alternate Tylenol with ibuprofen (unless contraindicated) for pain and fever.  OTC decongestant of choice. Follow manufacturers guidelines for dosing and safety precautions.   -We did discuss red flags and reasons to return to urgent care versus when to go to the ER.    Discussed DDx, management options (risks,benefits, and alternatives to planned treatment), natural progression.  Questions were encouraged and answered. Written information was provided and I did go over this with the patient in clinic today.  Instructed patient regarding red flags and to return to urgent care prn if new or worsening sx or if there is no improvement in condition prn.    Advised the patient to follow-up with the primary care physician for recheck, reevaluation, and consideration of further management.  I did instruct patient regarding medications prescribed, purpose, side effects, cautions.  Instructed patient to get a pharmacy consult when picking up any prescribed medications.  Strict ER precautions discussed for any mastoid pain, swelling of the face or around the eyes, visual disturbances, eye pain, chest pain, difficulty breathing, difficulty swallowing, wheezing, stridor, or drooling, fever that does not respond  to ibuprofen and Tylenol, inability to tolerate fluids, dehydration, especially in the setting of fever, chills, nausea or vomiting, lethargy.     Patient states they have good understanding and they are agreeable with the plan of care.     - doxycycline (VIBRAMYCIN) 100 MG Tab; Take 1 Tablet by mouth 2 times a day for 7 days.  Dispense: 14 Tablet; Refill: 0    2. Bacterial URI  - doxycycline (VIBRAMYCIN) 100 MG Tab; Take 1 Tablet by mouth 2 times a day for 7 days.  Dispense: 14 Tablet; Refill: 0         Pt is clinically stable at today's acute urgent care visit. Vital signs are normal and reassuring.  No acute distress noted. Appropriate for outpatient management at this time.        I personally reviewed prior external notes and test results pertinent to today's visit.  I have independently reviewed and interpreted all diagnostics ordered during this urgent care acute visit.        Please note that this dictation was created using voice recognition software. I have made a reasonable attempt to correct obvious errors, but I expect that there are errors of grammar and possibly content that I did not discover before finalizing the note.    This note was electronically signed by Iron SINGH, AMRIK, SHELBY, MAGDALENA